# Patient Record
Sex: FEMALE | Race: OTHER | HISPANIC OR LATINO | URBAN - METROPOLITAN AREA
[De-identification: names, ages, dates, MRNs, and addresses within clinical notes are randomized per-mention and may not be internally consistent; named-entity substitution may affect disease eponyms.]

---

## 2024-01-20 ENCOUNTER — EMERGENCY (EMERGENCY)
Facility: HOSPITAL | Age: 54
LOS: 1 days | Discharge: HOME | End: 2024-01-20
Attending: STUDENT IN AN ORGANIZED HEALTH CARE EDUCATION/TRAINING PROGRAM
Payer: COMMERCIAL

## 2024-01-20 VITALS
SYSTOLIC BLOOD PRESSURE: 126 MMHG | HEART RATE: 94 BPM | OXYGEN SATURATION: 98 % | RESPIRATION RATE: 18 BRPM | DIASTOLIC BLOOD PRESSURE: 76 MMHG | TEMPERATURE: 99 F

## 2024-01-20 VITALS
OXYGEN SATURATION: 97 % | RESPIRATION RATE: 18 BRPM | HEART RATE: 99 BPM | DIASTOLIC BLOOD PRESSURE: 83 MMHG | SYSTOLIC BLOOD PRESSURE: 120 MMHG | TEMPERATURE: 97 F | WEIGHT: 136.91 LBS

## 2024-01-20 LAB
ALBUMIN SERPL ELPH-MCNC: 4.1 G/DL — SIGNIFICANT CHANGE UP (ref 3.5–5)
ALP SERPL-CCNC: 153 U/L — HIGH (ref 40–120)
ALT FLD-CCNC: 45 U/L DA — SIGNIFICANT CHANGE UP (ref 10–60)
ANION GAP SERPL CALC-SCNC: 6 MMOL/L — SIGNIFICANT CHANGE UP (ref 5–17)
AST SERPL-CCNC: 48 U/L — HIGH (ref 10–40)
BASOPHILS # BLD AUTO: 0.03 K/UL — SIGNIFICANT CHANGE UP (ref 0–0.2)
BASOPHILS NFR BLD AUTO: 0.2 % — SIGNIFICANT CHANGE UP (ref 0–2)
BILIRUB SERPL-MCNC: 0.6 MG/DL — SIGNIFICANT CHANGE UP (ref 0.2–1.2)
BUN SERPL-MCNC: 13 MG/DL — SIGNIFICANT CHANGE UP (ref 7–18)
CALCIUM SERPL-MCNC: 9.5 MG/DL — SIGNIFICANT CHANGE UP (ref 8.4–10.5)
CHLORIDE SERPL-SCNC: 105 MMOL/L — SIGNIFICANT CHANGE UP (ref 96–108)
CO2 SERPL-SCNC: 27 MMOL/L — SIGNIFICANT CHANGE UP (ref 22–31)
CREAT SERPL-MCNC: 0.84 MG/DL — SIGNIFICANT CHANGE UP (ref 0.5–1.3)
EGFR: 84 ML/MIN/1.73M2 — SIGNIFICANT CHANGE UP
EOSINOPHIL # BLD AUTO: 0.06 K/UL — SIGNIFICANT CHANGE UP (ref 0–0.5)
EOSINOPHIL NFR BLD AUTO: 0.4 % — SIGNIFICANT CHANGE UP (ref 0–6)
GLUCOSE SERPL-MCNC: 130 MG/DL — HIGH (ref 70–99)
HCT VFR BLD CALC: 40.8 % — SIGNIFICANT CHANGE UP (ref 34.5–45)
HGB BLD-MCNC: 13.6 G/DL — SIGNIFICANT CHANGE UP (ref 11.5–15.5)
IMM GRANULOCYTES NFR BLD AUTO: 0.4 % — SIGNIFICANT CHANGE UP (ref 0–0.9)
LYMPHOCYTES # BLD AUTO: 14.2 % — SIGNIFICANT CHANGE UP (ref 13–44)
LYMPHOCYTES # BLD AUTO: 2.01 K/UL — SIGNIFICANT CHANGE UP (ref 1–3.3)
MCHC RBC-ENTMCNC: 29.6 PG — SIGNIFICANT CHANGE UP (ref 27–34)
MCHC RBC-ENTMCNC: 33.3 GM/DL — SIGNIFICANT CHANGE UP (ref 32–36)
MCV RBC AUTO: 88.7 FL — SIGNIFICANT CHANGE UP (ref 80–100)
MONOCYTES # BLD AUTO: 0.87 K/UL — SIGNIFICANT CHANGE UP (ref 0–0.9)
MONOCYTES NFR BLD AUTO: 6.2 % — SIGNIFICANT CHANGE UP (ref 2–14)
NEUTROPHILS # BLD AUTO: 11.08 K/UL — HIGH (ref 1.8–7.4)
NEUTROPHILS NFR BLD AUTO: 78.6 % — HIGH (ref 43–77)
NRBC # BLD: 0 /100 WBCS — SIGNIFICANT CHANGE UP (ref 0–0)
PLATELET # BLD AUTO: 389 K/UL — SIGNIFICANT CHANGE UP (ref 150–400)
POTASSIUM SERPL-MCNC: 3.6 MMOL/L — SIGNIFICANT CHANGE UP (ref 3.5–5.3)
POTASSIUM SERPL-SCNC: 3.6 MMOL/L — SIGNIFICANT CHANGE UP (ref 3.5–5.3)
PROT SERPL-MCNC: 8.2 G/DL — SIGNIFICANT CHANGE UP (ref 6–8.3)
RBC # BLD: 4.6 M/UL — SIGNIFICANT CHANGE UP (ref 3.8–5.2)
RBC # FLD: 12.3 % — SIGNIFICANT CHANGE UP (ref 10.3–14.5)
SODIUM SERPL-SCNC: 138 MMOL/L — SIGNIFICANT CHANGE UP (ref 135–145)
T3 SERPL-MCNC: 161 NG/DL — SIGNIFICANT CHANGE UP (ref 80–200)
T3FREE SERPL-MCNC: 3.45 PG/ML — SIGNIFICANT CHANGE UP (ref 2–4.4)
T4 AB SER-ACNC: 9.3 UG/DL — SIGNIFICANT CHANGE UP (ref 4.6–12)
TSH SERPL-MCNC: 0.41 UU/ML — SIGNIFICANT CHANGE UP (ref 0.34–4.82)
WBC # BLD: 14.11 K/UL — HIGH (ref 3.8–10.5)
WBC # FLD AUTO: 14.11 K/UL — HIGH (ref 3.8–10.5)

## 2024-01-20 PROCEDURE — 36415 COLL VENOUS BLD VENIPUNCTURE: CPT

## 2024-01-20 PROCEDURE — 70491 CT SOFT TISSUE NECK W/DYE: CPT | Mod: MA

## 2024-01-20 PROCEDURE — 80053 COMPREHEN METABOLIC PANEL: CPT

## 2024-01-20 PROCEDURE — 76536 US EXAM OF HEAD AND NECK: CPT

## 2024-01-20 PROCEDURE — 96360 HYDRATION IV INFUSION INIT: CPT | Mod: XU

## 2024-01-20 PROCEDURE — 84436 ASSAY OF TOTAL THYROXINE: CPT

## 2024-01-20 PROCEDURE — 84443 ASSAY THYROID STIM HORMONE: CPT

## 2024-01-20 PROCEDURE — 85025 COMPLETE CBC W/AUTO DIFF WBC: CPT

## 2024-01-20 PROCEDURE — 99285 EMERGENCY DEPT VISIT HI MDM: CPT

## 2024-01-20 PROCEDURE — 99284 EMERGENCY DEPT VISIT MOD MDM: CPT | Mod: 25

## 2024-01-20 PROCEDURE — 76536 US EXAM OF HEAD AND NECK: CPT | Mod: 26

## 2024-01-20 PROCEDURE — 70491 CT SOFT TISSUE NECK W/DYE: CPT | Mod: 26,MA

## 2024-01-20 PROCEDURE — 84481 FREE ASSAY (FT-3): CPT

## 2024-01-20 PROCEDURE — 84480 ASSAY TRIIODOTHYRONINE (T3): CPT

## 2024-01-20 RX ORDER — ACETAMINOPHEN 500 MG
975 TABLET ORAL ONCE
Refills: 0 | Status: COMPLETED | OUTPATIENT
Start: 2024-01-20 | End: 2024-01-20

## 2024-01-20 RX ORDER — SODIUM CHLORIDE 9 MG/ML
1000 INJECTION INTRAMUSCULAR; INTRAVENOUS; SUBCUTANEOUS ONCE
Refills: 0 | Status: COMPLETED | OUTPATIENT
Start: 2024-01-20 | End: 2024-01-20

## 2024-01-20 RX ADMIN — Medication 975 MILLIGRAM(S): at 17:30

## 2024-01-20 RX ADMIN — SODIUM CHLORIDE 1000 MILLILITER(S): 9 INJECTION INTRAMUSCULAR; INTRAVENOUS; SUBCUTANEOUS at 14:44

## 2024-01-20 RX ADMIN — SODIUM CHLORIDE 1000 MILLILITER(S): 9 INJECTION INTRAMUSCULAR; INTRAVENOUS; SUBCUTANEOUS at 13:44

## 2024-01-20 RX ADMIN — Medication 975 MILLIGRAM(S): at 18:30

## 2024-01-20 NOTE — ED PROVIDER NOTE - PHYSICAL EXAMINATION
Gen: Patient is NAD, AAOx3, able to ambulate without assistance  HEENT: NCAT, protecting airway, controlling oral secretions, normal conjunctiva, tongue midline, oral mucosa moist, anterior neck swelling noted with no overlying skin changes   Lung: CTAB, no respiratory distress, no wheezes/rhonchi/rales B/L, speaking in full sentences  CV: RRR, no murmurs, rubs or gallops, distal pulses 2+ b/l  Abd: soft, NT, ND, no guarding, no rigidity, no rebound tenderness, no CVA tenderness   MSK: no visible deformities, ROM normal in UE/LE  Neuro: No focal sensory or motor deficits  Skin: Warm, well perfused, no rash, no leg swelling  Psych: normal affect, calm

## 2024-01-20 NOTE — ED PROVIDER NOTE - ATTENDING CONTRIBUTION TO CARE
Patient presenting with facial swelling airway intact speaking full sentences tolerating p.o. clinically stable will obtain CT rule out infection rule out surgical airway monitor and reassess

## 2024-01-20 NOTE — ED PROVIDER NOTE - NS ED ATTENDING STATEMENT MOD
I have seen and examined this patient and fully participated in the care of this patient as the teaching attending.  The service was shared with the SHAYLEE.  I reviewed and verified the documentation. Attending with

## 2024-01-20 NOTE — ED PROVIDER NOTE - PROGRESS NOTE DETAILS
Segundo PGY3: CT imaging reviewed. CT significant for a 5.6 cm hemorrhagic thyroid cyst with right sided midline shift of larynx and trachea. patient reassessed. reports neck pain and shortness of breath. Patient is speaking full sentences, controlling her secretions and satting 99% on RA. Case discussed with on-call ENT provider via transfer center who recommends patient can be dispo'ed per ED team and that no acute intervention required from ENT standpoint until the biopsy is performed. A shared decision was made with patient to be discharged and urgent follow up with ENT to coordinate for biopsy and strict return precautions.

## 2024-01-20 NOTE — ED PROVIDER NOTE - OBJECTIVE STATEMENT
53-year-old female, no medical history, presenting with 2-day onset of anterior neck swelling, pain.  Patient reports that she has been having cough, sore throat for the past week.  She started noticing neck swelling and pain yesterday night when she was showering.  Reports associated symptoms of painful swallowing.  Reports mild shortness of breath.  Denies fever, chest pain, prior history of thyroid issues, difficulty swallowing solid food or liquid.  History obtained via [place] interpretation service by [place], [place] [id number].

## 2024-01-20 NOTE — ED PROVIDER NOTE - NSFOLLOWUPINSTRUCTIONS_ED_ALL_ED_FT
You came to the emergency room because of neck swelling and pain.      Your symptom is due to a 5.6 cm thyroid cyst.      Please follow-up with the ear-nose-throat (ENT) doctor in the clinic within the next 1 to 3 days for further evaluation and monitoring and to coordinate for the biopsy.     A representative from [facility] will contact you within the next few days to schedule for an appointment.      Please come back to the emergency room if your symptoms get worse -worsening shortness of breath, unable to swallow solid or liquid food, drooling.

## 2024-01-20 NOTE — ED PROVIDER NOTE - PATIENT PORTAL LINK FT
You can access the FollowMyHealth Patient Portal offered by [facility] by registering at the following website: [email or web address]. By joining [employer]’s FollowMyHealth portal, you will also be able to view your health information using other applications (apps) compatible with our system.

## 2024-01-20 NOTE — ED PROVIDER NOTE - CLINICAL SUMMARY MEDICAL DECISION MAKING FREE TEXT BOX
53-year-old female, no medical history, presenting with 2-day onset of anterior neck swelling, pain.  Patient reports that she has been having cough, sore throat for the past week.  She started noticing neck swelling and pain yesterday night when she was showering.  Reports associated symptoms of painful swallowing.  Reports mild shortness of breath.  Denies fever, chest pain, prior history of thyroid issues, difficulty swallowing solid food or liquid.  History obtained via [employer] interpretation service by [place], [place] [id number]. Vital signs within normal limits.  Physical exam patient's not in acute distress, protecting airway, controlling oral secretions, significant anterior neck swelling appreciated with no overlying skin changes, normal respiratory effort, clear lung exam bilaterally, abdomen soft, nontender, no leg swelling noted.  Differentials include but not limited to thyroid goiter versus abscess.  Will get labs, TSH, ultrasound thyroid, CT neck with soft tissue with IV contrast, reassess.

## 2024-01-22 ENCOUNTER — EMERGENCY (EMERGENCY)
Facility: HOSPITAL | Age: 54
LOS: 1 days | Discharge: HOME | End: 2024-01-22
Attending: EMERGENCY MEDICINE | Admitting: EMERGENCY MEDICINE
Payer: COMMERCIAL

## 2024-01-22 VITALS
SYSTOLIC BLOOD PRESSURE: 137 MMHG | DIASTOLIC BLOOD PRESSURE: 72 MMHG | RESPIRATION RATE: 16 BRPM | HEART RATE: 118 BPM | OXYGEN SATURATION: 98 % | TEMPERATURE: 99 F

## 2024-01-22 VITALS
DIASTOLIC BLOOD PRESSURE: 69 MMHG | OXYGEN SATURATION: 100 % | SYSTOLIC BLOOD PRESSURE: 113 MMHG | HEART RATE: 103 BPM | RESPIRATION RATE: 18 BRPM | TEMPERATURE: 100 F

## 2024-01-22 PROBLEM — Z00.00 ENCOUNTER FOR PREVENTIVE HEALTH EXAMINATION: Status: ACTIVE | Noted: 2024-01-22

## 2024-01-22 LAB
ALBUMIN SERPL ELPH-MCNC: 4 G/DL — SIGNIFICANT CHANGE UP (ref 3.3–5)
ALP SERPL-CCNC: 211 U/L — HIGH (ref 40–120)
ALT FLD-CCNC: 59 U/L — HIGH (ref 4–33)
ANION GAP SERPL CALC-SCNC: 16 MMOL/L — HIGH (ref 7–14)
AST SERPL-CCNC: 83 U/L — HIGH (ref 4–32)
B PERT DNA SPEC QL NAA+PROBE: SIGNIFICANT CHANGE UP
B PERT+PARAPERT DNA PNL SPEC NAA+PROBE: SIGNIFICANT CHANGE UP
BASOPHILS # BLD AUTO: 0.03 K/UL — SIGNIFICANT CHANGE UP (ref 0–0.2)
BASOPHILS NFR BLD AUTO: 0.2 % — SIGNIFICANT CHANGE UP (ref 0–2)
BILIRUB SERPL-MCNC: 1 MG/DL — SIGNIFICANT CHANGE UP (ref 0.2–1.2)
BORDETELLA PARAPERTUSSIS (RAPRVP): SIGNIFICANT CHANGE UP
BUN SERPL-MCNC: 12 MG/DL — SIGNIFICANT CHANGE UP (ref 7–23)
C PNEUM DNA SPEC QL NAA+PROBE: SIGNIFICANT CHANGE UP
CALCIUM SERPL-MCNC: 9.4 MG/DL — SIGNIFICANT CHANGE UP (ref 8.4–10.5)
CHLORIDE SERPL-SCNC: 99 MMOL/L — SIGNIFICANT CHANGE UP (ref 98–107)
CO2 SERPL-SCNC: 20 MMOL/L — LOW (ref 22–31)
CREAT SERPL-MCNC: 0.6 MG/DL — SIGNIFICANT CHANGE UP (ref 0.5–1.3)
EGFR: 108 ML/MIN/1.73M2 — SIGNIFICANT CHANGE UP
EOSINOPHIL # BLD AUTO: 0 K/UL — SIGNIFICANT CHANGE UP (ref 0–0.5)
EOSINOPHIL NFR BLD AUTO: 0 % — SIGNIFICANT CHANGE UP (ref 0–6)
FLUAV SUBTYP SPEC NAA+PROBE: SIGNIFICANT CHANGE UP
FLUBV RNA SPEC QL NAA+PROBE: SIGNIFICANT CHANGE UP
GLUCOSE SERPL-MCNC: 116 MG/DL — HIGH (ref 70–99)
HADV DNA SPEC QL NAA+PROBE: SIGNIFICANT CHANGE UP
HCOV 229E RNA SPEC QL NAA+PROBE: SIGNIFICANT CHANGE UP
HCOV HKU1 RNA SPEC QL NAA+PROBE: SIGNIFICANT CHANGE UP
HCOV NL63 RNA SPEC QL NAA+PROBE: SIGNIFICANT CHANGE UP
HCOV OC43 RNA SPEC QL NAA+PROBE: SIGNIFICANT CHANGE UP
HCT VFR BLD CALC: 38.4 % — SIGNIFICANT CHANGE UP (ref 34.5–45)
HGB BLD-MCNC: 13.2 G/DL — SIGNIFICANT CHANGE UP (ref 11.5–15.5)
HMPV RNA SPEC QL NAA+PROBE: SIGNIFICANT CHANGE UP
HPIV1 RNA SPEC QL NAA+PROBE: SIGNIFICANT CHANGE UP
HPIV2 RNA SPEC QL NAA+PROBE: SIGNIFICANT CHANGE UP
HPIV3 RNA SPEC QL NAA+PROBE: SIGNIFICANT CHANGE UP
HPIV4 RNA SPEC QL NAA+PROBE: SIGNIFICANT CHANGE UP
IANC: 13 K/UL — HIGH (ref 1.8–7.4)
IMM GRANULOCYTES NFR BLD AUTO: 0.7 % — SIGNIFICANT CHANGE UP (ref 0–0.9)
LYMPHOCYTES # BLD AUTO: 1.48 K/UL — SIGNIFICANT CHANGE UP (ref 1–3.3)
LYMPHOCYTES # BLD AUTO: 9.3 % — LOW (ref 13–44)
M PNEUMO DNA SPEC QL NAA+PROBE: SIGNIFICANT CHANGE UP
MCHC RBC-ENTMCNC: 29.3 PG — SIGNIFICANT CHANGE UP (ref 27–34)
MCHC RBC-ENTMCNC: 34.4 GM/DL — SIGNIFICANT CHANGE UP (ref 32–36)
MCV RBC AUTO: 85.3 FL — SIGNIFICANT CHANGE UP (ref 80–100)
MONOCYTES # BLD AUTO: 1.22 K/UL — HIGH (ref 0–0.9)
MONOCYTES NFR BLD AUTO: 7.7 % — SIGNIFICANT CHANGE UP (ref 2–14)
NEUTROPHILS # BLD AUTO: 13 K/UL — HIGH (ref 1.8–7.4)
NEUTROPHILS NFR BLD AUTO: 82.1 % — HIGH (ref 43–77)
NRBC # BLD: 0 /100 WBCS — SIGNIFICANT CHANGE UP (ref 0–0)
NRBC # FLD: 0 K/UL — SIGNIFICANT CHANGE UP (ref 0–0)
PLATELET # BLD AUTO: 354 K/UL — SIGNIFICANT CHANGE UP (ref 150–400)
POTASSIUM SERPL-MCNC: 4.4 MMOL/L — SIGNIFICANT CHANGE UP (ref 3.5–5.3)
POTASSIUM SERPL-SCNC: 4.4 MMOL/L — SIGNIFICANT CHANGE UP (ref 3.5–5.3)
PROT SERPL-MCNC: 8.5 G/DL — HIGH (ref 6–8.3)
RAPID RVP RESULT: DETECTED
RBC # BLD: 4.5 M/UL — SIGNIFICANT CHANGE UP (ref 3.8–5.2)
RBC # FLD: 12.8 % — SIGNIFICANT CHANGE UP (ref 10.3–14.5)
RSV RNA SPEC QL NAA+PROBE: SIGNIFICANT CHANGE UP
RV+EV RNA SPEC QL NAA+PROBE: SIGNIFICANT CHANGE UP
SARS-COV-2 RNA SPEC QL NAA+PROBE: DETECTED
SODIUM SERPL-SCNC: 135 MMOL/L — SIGNIFICANT CHANGE UP (ref 135–145)
WBC # BLD: 15.84 K/UL — HIGH (ref 3.8–10.5)
WBC # FLD AUTO: 15.84 K/UL — HIGH (ref 3.8–10.5)

## 2024-01-22 PROCEDURE — 76536 US EXAM OF HEAD AND NECK: CPT | Mod: 26

## 2024-01-22 PROCEDURE — 93010 ELECTROCARDIOGRAM REPORT: CPT

## 2024-01-22 PROCEDURE — 99285 EMERGENCY DEPT VISIT HI MDM: CPT

## 2024-01-22 RX ORDER — SODIUM CHLORIDE 9 MG/ML
1000 INJECTION, SOLUTION INTRAVENOUS ONCE
Refills: 0 | Status: COMPLETED | OUTPATIENT
Start: 2024-01-22 | End: 2024-01-22

## 2024-01-22 RX ORDER — ONDANSETRON 8 MG/1
4 TABLET, FILM COATED ORAL ONCE
Refills: 0 | Status: COMPLETED | OUTPATIENT
Start: 2024-01-22 | End: 2024-01-22

## 2024-01-22 RX ORDER — ACETAMINOPHEN 500 MG
1000 TABLET ORAL ONCE
Refills: 0 | Status: COMPLETED | OUTPATIENT
Start: 2024-01-22 | End: 2024-01-22

## 2024-01-22 RX ORDER — KETOROLAC TROMETHAMINE 30 MG/ML
30 SYRINGE (ML) INJECTION ONCE
Refills: 0 | Status: DISCONTINUED | OUTPATIENT
Start: 2024-01-22 | End: 2024-01-22

## 2024-01-22 RX ORDER — IBUPROFEN 200 MG
1 TABLET ORAL
Qty: 16 | Refills: 0
Start: 2024-01-22 | End: 2024-01-25

## 2024-01-22 RX ADMIN — ONDANSETRON 4 MILLIGRAM(S): 8 TABLET, FILM COATED ORAL at 12:28

## 2024-01-22 RX ADMIN — Medication 30 MILLIGRAM(S): at 12:28

## 2024-01-22 RX ADMIN — SODIUM CHLORIDE 1000 MILLILITER(S): 9 INJECTION, SOLUTION INTRAVENOUS at 13:14

## 2024-01-22 RX ADMIN — Medication 400 MILLIGRAM(S): at 13:15

## 2024-01-22 NOTE — ED PROVIDER NOTE - PATIENT PORTAL LINK FT
You can access the FollowMyHealth Patient Portal offered by [facility] by registering at the following website: [email or web address]. By joining [facility]’s FollowMyHealth portal, you will also be able to view your health information using other applications (apps) compatible with our system.

## 2024-01-22 NOTE — ED PROVIDER NOTE - OBJECTIVE STATEMENT
54 yo F PMH Thyroid Cyst p/w increased pain and reported difficulty swallowing. Reports has not had any solid food since Sunday. Only water. Denies sob, fever, ear or throat pain, drooling. Seen 2 days ago in ED and CT reveals:   "There is a large cystic-appearing focus in the left   lobe of thyroid with slightly hyperintense central content that may be   internal hemorrhage if there has been recent or rapid growth. No   inflammatory type walls thickening or inflammation in the adjacent   central compartment to indicate infection. This measures 5.6 x 5.2 x 5.4   cm in maximum triplanar diameters (transverse x AP x CC). No inferior   extent past the clavicular heads. Smaller cystic nodule seen above it   (coronal image 41). The right lobe is grossly unremarkable, sparing a   subcentimeter nodule or two. "    Discharged with follow up with ENT doctor in 2 days. Came back in 1 day for increased pain and poor po intake since Sunday.   #[id number] Posler

## 2024-01-22 NOTE — ED PROVIDER NOTE - ATTENDING APP SHARED VISIT CONTRIBUTION OF CARE
53-year-old female no pmh pt was seen in the ER 2 days ago she had a CAT scan as well as an ultrasound bshowing hemorrhagic cyst. She was sent home with ENT follow-up.  She has not received a call from them and she is having worsening pain, fevers, increased difficulty swallowing therefore she came back to the emergency department.  She states her fever was 100F yesterday she did not take anything. Denies any sick contacts.    Pt tachycardic here uncomfortable appearing but handling secretions laying in bed without difficulty swallowing no stridor no pharyngeal edema anterior neck ttp. Will obtain labs and see if we can obtain ultrasound for comparison. Will provide analgesia and fluids. Will also obtain RVP to ensure US finding is not incidental.

## 2024-01-22 NOTE — ED PROVIDER NOTE - NSFOLLOWUPINSTRUCTIONS_ED_ALL_ED_FT
Thyroid Nodule  An adult, showing the location of the thyroid gland.  A thyroid nodule is an isolated growth of thyroid cells that forms a lump in the thyroid gland. The thyroid gland is a butterfly-shaped gland found in the lower front of the neck. It sends chemical messengers (hormones) through the blood to all parts of the body. These hormones are important in regulating body temperature and helping the body use energy.    Thyroid nodules are common. Most are not cancerous (are benign). You may have one nodule or several nodules.    There are different types of thyroid nodules. They include nodules that:  Grow and fill with fluid (thyroid cysts).  Produce too much thyroid hormone (hot nodules or hyperthyroid).  Produce no thyroid hormone (cold nodules or hypothyroid).  Form from cancer cells (thyroid cancers).  What are the causes?  In most cases, the cause of thyroid nodules is not known.    What increases the risk?  The following factors may make you more likely to develop thyroid nodules:  Age. Thyroid nodules are more common in people who are older than 45 years.  Female gender.  A family history that includes:  Thyroid nodules.  Pheochromocytoma.  Thyroid carcinoma.  Hyperparathyroidism.  Certain thyroid diseases, such as Hashimoto's thyroiditis.  Lack of iodine in your diet.  A history of head and neck radiation, such as from cancer treatments.  Type 2 diabetes.  What are the signs or symptoms?  In many cases, there are no symptoms. If you have symptoms, they may include:  A lump in your lower neck.  Feeling pressure, fullness, or a tickle in your throat.  Pain in your neck, jaw, or ear.  Having trouble swallowing or breathing.  Hot nodules may cause:  Weight loss.  Warm, flushed skin.  Feeling hot.  Feeling nervous.  A rapid or irregular heartbeat.  Cold nodules may cause:  Weight gain.  Dry skin.  Hair loss, brittle hair, or both.  Feeling cold.  Fatigue.  Thyroid cancer nodules may cause:  Hard nodules that can be felt along the thyroid gland.  Hoarseness.  Lumps in the tissue (lymph nodes) near your thyroid gland.  How is this diagnosed?  A thyroid nodule may be felt by your health care provider during a physical exam. This condition may also be diagnosed based on your symptoms. You may also have tests, including:  Blood tests to check how well your thyroid is working.  An ultrasound. This may be done to confirm the diagnosis.  A biopsy. This involves taking a sample from the nodule and looking at it under a microscope.  A thyroid scan. This test creates an image of the thyroid gland using a radioactive tracer.  Imaging tests such as an MRI or CT scan. These may be done if:  A nodule is large.  A nodule is blocking your airway.  Cancer is suspected.  How is this treated?  Treatment depends on the cause and size of your nodule or nodules. If a nodule is benign, treatment may not be necessary. Your health care provider may monitor the nodule to see if it goes away without treatment. If a nodule continues to grow, is cancerous, or does not go away, treatment may be needed. Treatment may include:  Having a cystic nodule drained with a needle.  Ablation therapy. In this treatment, alcohol is injected into the area of the nodule to destroy the cells. Ablation with heat may also be used. This is called thermal ablation.  Radioactive iodine. In this treatment, radioactive iodine is given as a pill or liquid that you drink. This substance causes the thyroid nodule to shrink.  Surgery to remove the nodule or nodules. Part or all of your thyroid gland may also need to be removed.  Medicines to treat hyperthyroidism.  Follow these instructions at home:  Pay attention to any changes in your thyroid nodule or nodules.  Take over-the-counter and prescription medicines only as told by your health care provider.  Keep all follow-up visits. This is important.  Contact a health care provider if:  You have trouble sleeping.  You have muscle weakness.  You have significant weight loss without changing your eating habits.  You feel nervous.  You have trouble swallowing.  You have increased swelling.  You have a rapid or irregular heartbeat.  Get help right away if:  You have chest pain.  You faint or lose consciousness.  Your nodule makes it hard for you to breathe.  These symptoms may be an emergency. Get help right away. Call 911.  Do not wait to see if the symptoms will go away.  Do not drive yourself to the hospital.  Summary  A thyroid nodule is an isolated growth of thyroid cells that forms a lump in your thyroid gland.  Thyroid nodules are common. Most are not cancerous.  Your health care provider may monitor the nodule to see if it goes away without treatment. If a nodule continues to grow, is cancerous, or does not go away, treatment may be needed.  Treatment depends on the cause and size of your nodule or nodules.  This information is not intended to replace advice given to you by your health care provider. Make sure you discuss any questions you have with your health care provider. COVID-19  COVID-19 is an infection caused by a virus called SARS-CoV-2. Most people who get COVID-19 have mild to moderate symptoms. Some have little to no symptoms. In others, the virus may cause a severe infection.    What are the causes?  The human body, showing how the coronavirus travels from the air to a person's lungs.  COVID-19 is caused by a coronavirus. The virus may be in the air as droplets or as tiny specks of fluid (aerosols). It may also be on surfaces. You may catch the virus if you:  Breathe in droplets when a person with COVID-19 breathes, speaks, sings, coughs, or sneezes.  Touch something that has the virus on it and then touch your mouth, nose, or eyes.  What increases the risk?  Risk for infection:    You are more likely to get COVID-19 if:  You are within 6 ft (1.8 m) of a person who has COVID-19 for 15 minutes or longer.  You provide care to a person who has COVID-19.  You are in close contact with others. This includes hugging, kissing, or sharing utensils.  Risk for serious illness caused by COVID-19:    You are more likely to get very ill from COVID-19 if:  You have cancer.  You have a long-term (chronic) disease. This may be:  A chronic lung disease, such as pulmonary embolism, chronic obstructive pulmonary disease (COPD), or cystic fibrosis.  A disease that affects your body's defense system (immune system). If you have a weak immune system, you are said to be immunocompromised.  A serious heart condition, such as heart failure, coronary artery disease, or cardiomyopathy.  Diabetes.  Chronic kidney disease.  A liver disease, such as cirrhosis, nonalcoholic fatty liver disease, alcoholic liver disease, or autoimmune hepatitis.  You are obese.  You are pregnant or were just pregnant.  You have sickle cell disease.  What are the signs or symptoms?  Symptoms of COVID-19 can range from mild to severe. They may appear any time from 2 to 14 days after you are exposed. They include:  Fever or chills.  Shortness of breath or trouble breathing.  Feeling tired.  Headaches, body aches, or muscle aches.  A runny or stuffy nose.  Sneezing, coughing, or a sore throat.  New loss of taste or smell.  You may also have stomach problems, such as nausea, vomiting, or diarrhea.    In some cases, you may not have any symptoms.    How is this diagnosed?  A sample being collected by swabbing the nose.  COVID-19 may be diagnosed by testing a sample to check for the virus. The most common tests are the PCR test and the antigen test. Tests may be done in the lab or at home. They include:  Using a swab to take a sample of fluid from your nose.  Testing a sample of saliva from your mouth.  Testing a sample of mucus from your lungs (sputum).  How is this treated?  Treatment for COVID-19 depends on how severe your condition is.  Mild symptoms can be treated at home. You should rest, drink fluids, and take over-the-counter medicine.  If you have symptoms and risk factors, you may be prescribed a medicine that fights viruses (antiviral).  Severe symptoms may be treated in a hospital intensive care unit (ICU). Treatment may include:  Extra oxygen given through a tube in the nose, a face mask, or a camara.  Medicines. These may include:  Antivirals, such as remdesivir.  Anti-inflammatories, such as corticosteroids. These help reduce inflammation.  Antithrombotics. These help prevent or treat blood clots.  Convalescent plasma. This helps boost your immune system.  Prone positioning. This is when you are laid on your stomach to help oxygen get into your lungs.  Infection control measures.  If you are at risk for a more serious illness, your health care provider may prescribe two medicines to help your immune system protect you. These are called long-acting monoclonal antibodies. They are given together every 6 months.    How is this prevented?  To protect yourself:    Get the vaccine or vaccine series if you meet the guidelines. You can even get the vaccine while you are pregnant or making breast milk (lactating).  Get an added dose of the vaccine if you are immunocompromised. This applies if you have had an organ transplant or if you have a condition that affects your immune system.  You should get the added dose 4 weeks after you got the first one.  If you get an mRNA vaccine, you will need to get 3 doses.  Talk to your provider about getting experimental monoclonal antibodies. This treatment can help prevent severe illness. It may be given to you if:  You are immunocompromised.  You cannot get the vaccine. You may not get the vaccine if you have a severe allergic reaction to it or to what it is made of.  You are not fully vaccinated.  You are in a place where there is COVID-19 and:  You are in close contact with someone who has COVID-19.  You are at high risk of being exposed.  You are at risk of illness from new variants of the virus.  To protect others:    If you have symptoms of COVID-19, take steps to stop the virus from spreading.  Stay home. Leave your house only to get medical care. Do not use public transit.  Do not travel while you are sick.  Wash your hands often with soap and water for at least 20 seconds. If soap and water are not available, use alcohol-based hand .  Make sure that all people in your household wash their hands well and often.  Cough or sneeze into a tissue or your sleeve or elbow. Do not cough or sneeze into your hand or into the air.  Where to find more information  Centers for Disease Control and Prevention (CDC): cdc.gov  World Health Organization (WHO): who.int  Get help right away if:  You have trouble breathing.  You have pain or pressure in your chest.  You are confused.  Your lips or fingernails turn blue.  You have trouble waking from sleep.  Your symptoms get worse.  These symptoms may be an emergency. Get help right away. Call 911.  Do not wait to see if the symptoms will go away.  Do not drive yourself to the hospital.  This information is not intended to replace advice given to you by your health care provider. Make sure you discuss any questions you have with your health care provider.

## 2024-01-22 NOTE — ED PROVIDER NOTE - PROGRESS NOTE DETAILS
KAYDEN Lundy: Spoke to ENT center and patient placed on schedule. As per team just waiting to get in touch with ENT MD  to set appointment time. Pending pain control. Received toradol IM.

## 2024-01-22 NOTE — ED ADULT NURSE NOTE - NSFALLUNIVINTERV_ED_ALL_ED
Bed/Stretcher in lowest position, wheels locked, appropriate side rails in place/Call bell, personal items and telephone in reach/Instruct patient to call for assistance before getting out of bed/chair/stretcher/Non-slip footwear applied when patient is off stretcher/[place] to call system/Physically safe environment - no spills, clutter or unnecessary equipment/Purposeful proactive rounding/Room/bathroom lighting operational, light cord in reach

## 2024-01-22 NOTE — ED ADULT NURSE NOTE - OBJECTIVE STATEMENT
Pt with co neck pain diagnosed with cyst to thyroid area but did not follow up , returns with pain to neck area. pt medicated for her symptoms , NP at bedside using  phone for translation. awaiting dispo.

## 2024-01-22 NOTE — ED ADULT TRIAGE NOTE - CHIEF COMPLAINT QUOTE
Patient c/o neck pain and swelling x 3 days. Endorsing fever. Patient seen in ED 2 days ago, diagnosed with thyroid cyst, told to follow up with ENT clinic for biopsy but patient does not have appt. Swelling noted, no redness. Able to tolerate PO, no signs of respiratory distress, able to speak in full sentences. Denies phx

## 2024-01-22 NOTE — ED PROVIDER NOTE - CLINICAL SUMMARY MEDICAL DECISION MAKING FREE TEXT BOX
52 yo F PMH Thyroid Cyst p/w increased pain and reported difficulty swallowing. Reports has not had any solid food since Sunday. Only water. Denies sob, fever, ear or throat pain, drooling. Seen 2 days ago in ED and CT reveals left thyroid cyst. Discharged with follow up with ENT doctor in 2 days. Came back in 1 day for increased pain and poor po intake since Sunday.  Pain control   Follow up on ENT referral   #[id number] Posler  Dispo Home with pain control and follow up plan and/or schedule

## 2024-01-22 NOTE — ED PROVIDER NOTE - IV ALTEPLASE EXCL ABS HIDDEN
Consult Note            Date:1/11/2022        Patient Name:Efe Poole     YOB: 1988     Age:33 y.o. Reason for Consult:    Medical h and p comorbid condition  Chief Complaint     Chief Complaint   Patient presents with    Abdominal Pain      No current abdominal pain. Want to go home. Is regretful of his comment about suicide and said he was drunk    History Obtained From   patient    History of Present Illness   80-year-old male past medical history of alcohol and chronic depression denies being on medications for hypertension presented to the emergency department Abdominal pain to his right lower quadrant. Patient drinks heavily every day 1-2 bottles of Crown. He has been drinking for many years. He was recently through a detox program and began to drink again. He denies any fevers or chills. He denies any current abdominal pain. He denies shortness of breath cough or congestion. He is pleasant and cooperative. He has managed through the [place]. He has been feeling depressed, he has had suicidal ideation but does not have any intent and is regretful of his comment he made in the emergency department. His work-up in the emergency department showed a normal white blood cell count. His electrolytes are normal.  His kidney function is normal.  His-personally reviewed. Urine toxicology screen is negative. Alcohol level was 298 with a 0.261%. Patient states that he stopped drinking 1-2 times a week he has never gone through alcohol withdrawal even when he stopped drinking for months at a time. He denies feeling edgy, no tremors, no headache no sensitivity to light or sound. Past Medical History     Past Medical History:   Diagnosis Date    Alcohol abuse     Depression 1/10/2022        Past Surgical History   History reviewed. No pertinent surgical history. Medications     Prior to Admission medications    Medication Sig Start Date End Date Taking?  Authorizing Provider ibuprofen (ADVIL;MOTRIN) 800 MG tablet Take 1 tablet by mouth every 8 hours as needed for Pain or Fever 3/22/21   Wally Acevedo PA-C        [START ON 1/12/2022] venlafaxine (EFFEXOR XR) extended release capsule 75 mg, Daily with breakfast  haloperidol lactate (HALDOL) injection 5 mg, Q6H PRN   Or  haloperidol (HALDOL) tablet 5 mg, Q6H PRN  hydrOXYzine (VISTARIL) capsule 50 mg, Q6H PRN   Or  hydrOXYzine (VISTARIL) injection 50 mg, Q6H PRN  acetaminophen (TYLENOL) tablet 650 mg, Q4H PRN  traZODone (DESYREL) tablet 50 mg, Nightly PRN  benztropine mesylate (COGENTIN) injection 2 mg, BID PRN  magnesium hydroxide (MILK OF MAGNESIA) 400 MG/5ML suspension 30 mL, Daily PRN  chlordiazePOXIDE (LIBRIUM) capsule 10 mg, Q4H PRN   Or  chlordiazePOXIDE (LIBRIUM) capsule 25 mg, Q4H PRN   Or  chlordiazePOXIDE (LIBRIUM) capsule 50 mg, Q2H PRN   Or  chlordiazePOXIDE (LIBRIUM) capsule 75 mg, Q1H PRN   Or  LORazepam (ATIVAN) injection 4 mg, M3K PRN  folic acid (FOLVITE) tablet 1 mg, Daily  multivitamin 1 tablet, Daily  ondansetron (ZOFRAN-ODT) disintegrating tablet 4 mg, Q6H PRN  thiamine tablet 100 mg, Daily  nicotine polacrilex (NICORETTE) gum 4 mg, Q2H PRN  NIFEdipine (PROCARDIA XL) extended release tablet 30 mg, Daily  influenza quadrivalent split vaccine (FLUZONE;FLUARIX;FLULAVAL;AFLURIA) injection 0.5 mL, Prior to discharge        Allergies   Patient has no known allergies. Social History     Social History     Tobacco History     Smoking Status  Heavy Tobacco Smoker    Smokeless Tobacco Use  Never Used          Alcohol History     Alcohol Use Status  Yes          Drug Use     Drug Use Status  Yes          Sexual Activity     Sexually Active  Yes                Family History   History reviewed. No pertinent family history. Review of Systems   History obtained from the patient.   12 point review of systems reviewed with patient pertinent positives as in HPI otherwise review systems negative    Physical Exam   BP (!) 149/104 Comment: RN Notified  Pulse 96   Temp 98.8 °F (37.1 °C) (Oral)   Resp 18   Ht 5' 6\" (1.676 m)   Wt 200 lb (90.7 kg)   SpO2 99%   BMI 32.28 kg/m²     CONSTITUTIONAL:  awake, alert, cooperative, no apparent distress, and appears stated age  EYES:  Lids and lashes normal, pupils equal, round and reactive to light, extra ocular muscles intact, sclera clear, conjunctiva normal  ENT:  Normocephalic, without obvious abnormality, atraumatic, sinuses nontender on palpation, external ears without lesions, oral pharynx with moist mucus membranes, tonsils without erythema or exudates, gums normal and good dentition. NECK:  Supple, symmetrical, trachea midline, no adenopathy, thyroid symmetric, not enlarged and no tenderness, skin normal  BACK:  Symmetric, no curvature, spinous processes are non-tender on palpation, paraspinous muscles are non-tender on palpation, no costal vertebral tenderness  LUNGS:  No increased work of breathing, good air exchange, clear to auscultation bilaterally, no crackles or wheezing  CARDIOVASCULAR:  Normal apical impulse, regular rate and rhythm, normal S1 and S2, no S3 or S4, and no murmur noted  ABDOMEN:  No scars, normal bowel sounds, soft, non-distended, non-tender, no masses palpated, no hepatosplenomegally  MUSCULOSKELETAL:  There is no redness, warmth, or swelling of the joints. Full range of motion noted. Motor strength is 5 out of 5 all extremities bilaterally. Tone is normal.  NEUROLOGIC:  Awake, alert, oriented to name, place and time. Cranial nerves II-XII are grossly intact. Motor is 5 out of 5 bilaterally. Cerebellar finger to nose, heel to shin intact. Sensory is intact.   Babinski down going, Romberg negative, and gait is normal.  SKIN:  skin warm dry and intact    Labs    CBC:  Recent Labs     01/09/22  1700   WBC 5.5   RBC 4.98   HGB 15.1   HCT 44.5   MCV 89.3   RDW 14.5        CHEMISTRIES:  Recent Labs     01/09/22  1700 01/09/22  1754     --    K 4.7 --      --    CO2 25  --    BUN 9  --    CREATININE 1.02 1.4*   GLUCOSE 110*  --    MG 2.2  --      PT/INR:No results for input(s): PROTIME, INR in the last 72 hours. APTT:No results for input(s): APTT in the last 72 hours. LIVER PROFILE:  Recent Labs     01/09/22  1700   AST 34   ALT 29   BILITOT <0.2   ALKPHOS 91       Imaging/Diagnostics   No results found.     Assessment      Hospital Problems           Last Modified POA    Depression 1/10/2022 Yes      Acute on chronic depression  chronic alcohol dependence  nicotine dependence  abdominal pain-resolved  hypertension  Plan   patient seen and examined  afebrile  hemodynamically stable  no evidence of acute alcohol withdrawal  psychiatry admitting to inpatient  he has no medical history  his blood pressure was noted to be elevated on arrival-he was started on nifedipine 30 mg daily-blood pressure is better controlled today  outpatient echocardiogram  patient can be followed as outpatient for his blood pressure please call if blood pressure uncontrolled  he will need nifedipine 30 mg tablet oral daily at discharge this may be increased to 60 if continues to be uncontrolled  Electronically signed by SILVANA Alegria on 1/11/22 at 10:44 AM EST show

## 2024-01-23 PROBLEM — E04.1 NONTOXIC SINGLE THYROID NODULE: Chronic | Status: ACTIVE | Noted: 2024-01-22

## 2024-01-28 ENCOUNTER — NON-APPOINTMENT (OUTPATIENT)
Age: 54
End: 2024-01-28

## 2024-01-29 ENCOUNTER — APPOINTMENT (OUTPATIENT)
Dept: OTOLARYNGOLOGY | Facility: CLINIC | Age: 54
End: 2024-01-29
Payer: COMMERCIAL

## 2024-01-29 VITALS — BODY MASS INDEX: 24.84 KG/M2 | HEIGHT: 62 IN | WEIGHT: 135 LBS

## 2024-01-29 DIAGNOSIS — E04.1 NONTOXIC SINGLE THYROID NODULE: ICD-10-CM

## 2024-01-29 PROCEDURE — 31575 DIAGNOSTIC LARYNGOSCOPY: CPT

## 2024-01-29 PROCEDURE — 99204 OFFICE O/P NEW MOD 45 MIN: CPT | Mod: 25

## 2024-01-29 NOTE — ASSESSMENT
[FreeTextEntry1] : 53 year old female for initial evaluation of thyroid nodules,: Recent CT showed large left thyroid cystic mass up to 5.6 cm in diameter and with possible internal hemorrhage given recent onset of pain. Larynx and trachea shifted midline    -Fiberoptic exam showed patient airway--no supraglottic airway compromise -Reviewed imaging in depth with pt and her daughter -Will refer for IR US guided FNA and decompression aspiration of the left thyroid mass.  -Will assist with setting up appointment for this week --Instructed to present to ER if any acute worsening -Follow up in 1 week, to re-evaluate  -They are in agreement with this plan

## 2024-01-30 ENCOUNTER — RESULT REVIEW (OUTPATIENT)
Age: 54
End: 2024-01-30

## 2024-02-02 ENCOUNTER — APPOINTMENT (OUTPATIENT)
Dept: ULTRASOUND IMAGING | Facility: IMAGING CENTER | Age: 54
End: 2024-02-02
Payer: COMMERCIAL

## 2024-02-02 ENCOUNTER — OUTPATIENT (OUTPATIENT)
Dept: OUTPATIENT SERVICES | Facility: HOSPITAL | Age: 54
LOS: 1 days | End: 2024-02-02
Payer: COMMERCIAL

## 2024-02-02 ENCOUNTER — RESULT REVIEW (OUTPATIENT)
Age: 54
End: 2024-02-02

## 2024-02-02 DIAGNOSIS — E04.1 NONTOXIC SINGLE THYROID NODULE: ICD-10-CM

## 2024-02-02 PROCEDURE — 10005 FNA BX W/US GDN 1ST LES: CPT

## 2024-02-02 PROCEDURE — 88172 CYTP DX EVAL FNA 1ST EA SITE: CPT

## 2024-02-02 PROCEDURE — 88173 CYTOPATH EVAL FNA REPORT: CPT | Mod: 26

## 2024-02-02 PROCEDURE — 88173 CYTOPATH EVAL FNA REPORT: CPT

## 2024-02-05 ENCOUNTER — APPOINTMENT (OUTPATIENT)
Dept: OTOLARYNGOLOGY | Facility: CLINIC | Age: 54
End: 2024-02-05
Payer: COMMERCIAL

## 2024-02-05 VITALS — BODY MASS INDEX: 24.84 KG/M2 | WEIGHT: 135 LBS | HEIGHT: 62 IN

## 2024-02-05 DIAGNOSIS — E04.9 NONTOXIC GOITER, UNSPECIFIED: ICD-10-CM

## 2024-02-05 LAB — NON-GYNECOLOGICAL CYTOLOGY STUDY: SIGNIFICANT CHANGE UP

## 2024-02-05 PROCEDURE — 31575 DIAGNOSTIC LARYNGOSCOPY: CPT

## 2024-02-05 PROCEDURE — 99214 OFFICE O/P EST MOD 30 MIN: CPT | Mod: 25

## 2024-02-05 NOTE — HISTORY OF PRESENT ILLNESS
[de-identified] : 53 year old female for follow up of thyroid nodules. She is s/p FNA on 2/2/24, pathology pending  States she has phlegm that she cannot bring up She has shortness of breath, especially worse when working (shes a .) None noted with rest Continuing to have dysphagia with both solids & liquids  Noticed 1/19 - went to bed friday night, woke up the next morning with a left sided neck  mass that prompted her to go to [facility]. Having mild pain to the area, especially worse when she lays flat. Having difficulty swallowing both solids and liquids. Painful swallowing and choking. Lost about 15lbs within 2 weeks due to inability to eat. She is experiencing SOB with both rest and activity. No changes to voice, but it is painful to speak.  No Smoking history No ETOH use  CT neck 1/20/24 IMPRESSION: Large left thyroid cystic mass up to 5.6 cm in diameter and with possible internal hemorrhage given recent onset of pain. Larynx and trachea shifted midline   US thyroid and parathyroid 1/20/24 IMPRESSION: 5.5 cm complex cyst of the left thyroid lobe likely to represent recent hemorrhage within pre existing thyroid nodule   Labs 1/20/24 TSH  0.41, t4 9.3

## 2024-02-05 NOTE — PHYSICAL EXAM
[Midline] : trachea located in midline position [Normal] : no rashes [de-identified] : right sided neck mass

## 2024-02-05 NOTE — ASSESSMENT
[FreeTextEntry1] : 53 year old female for initial evaluation of thyroid nodules,: Recent CT showed large left thyroid cystic mass up to 5.6 cm in diameter and with possible internal hemorrhage given recent onset of pain. Larynx and trachea shifted midline    -Fiberoptic exam showed patient airway--no supraglottic airway compromise -Reviewed imaging in depth with pt and her daughter -Pathology pending, will call patient with results  -Discussed treatment options, including  thyroid lobectomy and observation with surveillance ultrasounds -Pt expressed wishes to proceed with surgery at this time --will book for left thyroid lobectomy at this time. She understands need for possible completion thyroidectomy if indicated on final pathology -Follow up in 3 weeks to discuss pathology and surgery further and next steps    --Instructed to present to ER if any acute worsening -They are in agreement with this plan

## 2024-02-16 ENCOUNTER — APPOINTMENT (OUTPATIENT)
Dept: CT IMAGING | Facility: CLINIC | Age: 54
End: 2024-02-16
Payer: COMMERCIAL

## 2024-02-16 PROCEDURE — 70491 CT SOFT TISSUE NECK W/DYE: CPT

## 2024-02-21 LAB — THYROSEQ RESULT: SIGNIFICANT CHANGE UP

## 2024-02-26 ENCOUNTER — APPOINTMENT (OUTPATIENT)
Dept: OTOLARYNGOLOGY | Facility: CLINIC | Age: 54
End: 2024-02-26
Payer: COMMERCIAL

## 2024-02-26 VITALS
SYSTOLIC BLOOD PRESSURE: 112 MMHG | BODY MASS INDEX: 24.84 KG/M2 | DIASTOLIC BLOOD PRESSURE: 78 MMHG | WEIGHT: 135 LBS | HEART RATE: 74 BPM | HEIGHT: 62 IN

## 2024-02-26 PROCEDURE — 31575 DIAGNOSTIC LARYNGOSCOPY: CPT

## 2024-02-26 PROCEDURE — 99214 OFFICE O/P EST MOD 30 MIN: CPT | Mod: 25

## 2024-02-26 NOTE — ASSESSMENT
[FreeTextEntry1] : 53 year old female for initial evaluation of thyroid nodules,: Recent CT showed large left thyroid cystic mass up to 5.6 cm in diameter and with possible internal hemorrhage given recent onset of pain. Larynx and trachea shifted midline    --Fiberoptic exam showed stable patient airway--no supraglottic airway compromise --Reviewed Pathology and imaging in depth with pt and her daughter --Discussed treatment options, including thyroid lobectomy and observation with surveillance ultrasounds --Pt expressed wishes to proceed with surgery at this time --will book for left thyroid lobectomy at this time. She understands need for possible completion thyroidectomy if indicated on final pathology.  --Surgery scheduled for 3/5/24  --Instructed to present to ER if any acute worsening --They are in agreement with this plan

## 2024-02-26 NOTE — REASON FOR VISIT
[Other: _____] : [unfilled] [FreeTextEntry1] : Thyroid nodule [FreeTextEntry3] : Patient declined offer of translation service. Patient preferred to use accompanying family member/friend for translation.

## 2024-02-26 NOTE — HISTORY OF PRESENT ILLNESS
[de-identified] : 53 year old female for follow up of thyroid nodules. She is s/p FNA on 2/2/24 which was suspicious   for neoplasm (Category IV). Thyroseq was negative with low variable   Today, patient continues to report difficulty bringing up phlegm.  States shortness of breath has improved. No shortness of breath at rest.  States dysphagia has improved, she is currently tolerating regular diet and liquids.  No new neck swelling noted.   Noticed 1/19 - went to bed Friday night, woke up the next morning with a left sided neck  mass that prompted her to go to [facility] Having mild pain to the area, especially worse when she lays flat. Having difficulty swallowing both solids and liquids. Painful swallowing and choking. Lost about 15lbs within 2 weeks due to inability to eat. She is experiencing SOB with both rest and activity. No changes to voice, but it is painful to speak.  No Smoking history No ETOH use  2/2/24 FNA: Fine Needle Aspiration Report - Auth (Verified) Specimen(s) Submitted THYROID, LEFT, MID/LOWER POLE, US GUIDED FNA Final Diagnosis:  THYROID, LEFT, MID/LOWER POLE, US GUIDED FNA SUSPICIOUS FOR NEOPLASM   (Category IV). Hypo cellular specimen show follicular cells with abundant oncocytic cytoplasm mostly lying single with rare microfollicles and syncytial groups. These cells have enlarged nuclei, high nuclear/cytoplasmic ratio and finely granular cytoplasm. The background shows calcifications, macrophages and colloid but lacks lymphocytes. Although these features can be seen in hyperplastic nodule,   the overall features are suspicious for a oncocytic cell neoplasm  . Material has been sent for molecular test for thyroid cancer gene panel analysis at the request of the submitting clinician. An addendum will follow.  Test            Result         Reference      Accession      Result Date Range          Number ThyroSeq        See Note f                    [id number] 02/02/24 0              15:40 EST ThyroSeq Comment: CLINICAL HISTORY FNA cytology:  FN/SFN  ([place] IV)  THYROSEQ  V3 GC RESULTS SUMMARY LEFT MID THYROID, 5.8 CM NODULE, FNA  Test Result                             Probability of Cancer or NIFTP                 Potential Management NEGATIVE, WITH LOW               Likely low Variable * THYROID CELL CONTENT *See interpretation below for details   CT Neck 2/16/24:  COMPARISON: CT neck from 1/20/2024. Ultrasound neck from 2/20/2024 and back to 1/20/2024 FINDINGS: BRAIN: No focal enhancing lesions in the partially imaged brain parenchyma. SKULL BASE: Unremarkable AERODIGESTIVE TRACT: No masslike lesions or abnormal enhancement at the base of the tongue, nasopharynx and oropharynx. Lingual tonsillar hypertrophy and mild effacement of vallecular space. Vocal cords are symmetric. Parapharyngeal fat is intact. Supraglottic, glottic and infraglottic airways are patent. LYMPH NODES: No cervical adenopathy. SALIVARY GLANDS: Salivary glands are normal in size and symmetric. No abnormal enhancement or calcifications are identified. THYROID: Redemonstration of cystic mass lesions within left thyroid lobe measuring 4.9 cm x 4.5 a cm x 5.5 cm (TR X AP X CC), previously measured 5.6 cm x 5.2 cm x 5.4 cm. There is no enhancing nodular component. No surrounding inflammatory fat stranding or invasive features. Previously noted central hyperdensity component is not well seen on current exam. Associated mass effect with rightward tracheal deviation, similar to prior examination. Laterally mild compression of internal jugular vein which remains patent throughout.  Smaller hypodense nodule at superior aspect of the left lobe. Small hypodense nodule in the right thyroid lobe. PARANASAL SINUSES: The visualized paranasal sinuses are clear. Mastoids are intact. ORBITS: Intraorbital content is unremarkable. VESSELS: Patent extracranial carotid and vertebral arteries accounting for limits of given exam which is not tailored for angiographic assessment. LUNG APICES: No masses. BONES: No acute osseous abnormalities. IMPRESSION: Redemonstration of cystic mass lesion in the left thyroid lobe slightly decreased in size compared to prior. Resolution of previously noted central hyperdense component. Persistent mass effect with rightward tracheal deviation. No mass lesions or abnormal enhancement in aerodigestive mucosa. No cervical adenopathy. --- End of Report ---   CT neck 1/20/24 IMPRESSION: Large left thyroid cystic mass up to 5.6 cm in diameter and with possible internal hemorrhage given recent onset of pain. Larynx and trachea shifted midline   US thyroid and parathyroid 1/20/24 IMPRESSION: 5.5 cm complex cyst of the left thyroid lobe likely to represent recent hemorrhage within pre existing thyroid nodule   Labs 1/20/24 TSH  0.41, t4 9.3

## 2024-02-28 ENCOUNTER — OUTPATIENT (OUTPATIENT)
Dept: OUTPATIENT SERVICES | Facility: HOSPITAL | Age: 54
LOS: 1 days | End: 2024-02-28

## 2024-02-28 VITALS
DIASTOLIC BLOOD PRESSURE: 79 MMHG | HEIGHT: 61.5 IN | HEART RATE: 70 BPM | RESPIRATION RATE: 16 BRPM | SYSTOLIC BLOOD PRESSURE: 131 MMHG | TEMPERATURE: 98 F | OXYGEN SATURATION: 97 % | WEIGHT: 134.92 LBS

## 2024-02-28 DIAGNOSIS — E04.1 NONTOXIC SINGLE THYROID NODULE: ICD-10-CM

## 2024-02-28 DIAGNOSIS — Z90.49 ACQUIRED ABSENCE OF OTHER SPECIFIED PARTS OF DIGESTIVE TRACT: Chronic | ICD-10-CM

## 2024-02-28 DIAGNOSIS — Z98.890 OTHER SPECIFIED POSTPROCEDURAL STATES: Chronic | ICD-10-CM

## 2024-02-28 LAB
HCG UR QL: NEGATIVE — SIGNIFICANT CHANGE UP
HCT VFR BLD CALC: 36.8 % — SIGNIFICANT CHANGE UP (ref 34.5–45)
HGB BLD-MCNC: 12.6 G/DL — SIGNIFICANT CHANGE UP (ref 11.5–15.5)
MCHC RBC-ENTMCNC: 29.5 PG — SIGNIFICANT CHANGE UP (ref 27–34)
MCHC RBC-ENTMCNC: 34.2 GM/DL — SIGNIFICANT CHANGE UP (ref 32–36)
MCV RBC AUTO: 86.2 FL — SIGNIFICANT CHANGE UP (ref 80–100)
NRBC # BLD: 0 /100 WBCS — SIGNIFICANT CHANGE UP (ref 0–0)
NRBC # FLD: 0 K/UL — SIGNIFICANT CHANGE UP (ref 0–0)
PLATELET # BLD AUTO: 301 K/UL — SIGNIFICANT CHANGE UP (ref 150–400)
RBC # BLD: 4.27 M/UL — SIGNIFICANT CHANGE UP (ref 3.8–5.2)
RBC # FLD: 13.2 % — SIGNIFICANT CHANGE UP (ref 10.3–14.5)
WBC # BLD: 6.21 K/UL — SIGNIFICANT CHANGE UP (ref 3.8–10.5)
WBC # FLD AUTO: 6.21 K/UL — SIGNIFICANT CHANGE UP (ref 3.8–10.5)

## 2024-02-28 RX ORDER — SODIUM CHLORIDE 9 MG/ML
1000 INJECTION, SOLUTION INTRAVENOUS
Refills: 0 | Status: DISCONTINUED | OUTPATIENT
Start: 2024-03-05 | End: 2024-03-05

## 2024-02-28 NOTE — H&P PST ADULT - ENDOCRINE COMMENTS
hx of left neck mass which she first noted 1 month ago.  Thyroid nodule dx with thyroid nodule on subsequent imaging, benign on biopsy per pt.  Scheduled for Left thyroid lobectomy

## 2024-02-28 NOTE — H&P PST ADULT - NSANTHOSAYNRD_GEN_A_CORE
No. LULY screening performed.  STOP BANG Legend: 0-2 = LOW Risk; 3-4 = INTERMEDIATE Risk; 5-8 = HIGH Risk

## 2024-02-28 NOTE — H&P PST ADULT - PROBLEM SELECTOR PLAN 1
Left thyroid lobectomy 3/5/24    CBC UCG     Preop instructions and antibacterial soap given and explained (verbal and written), with teach back.

## 2024-02-28 NOTE — H&P PST ADULT - HISTORY OF PRESENT ILLNESS
52 y/o female with hx of left neck mass which she first noted 1 month ago. She also noted increase in mucous in  her throat at that times and intermittently  she felt like she needed to cough to bring up the mucous.  Thyroid nodule dx with thyroid nodule on subsequent imaging, benign on biopsy per pt.  Scheduled for Left thyroid lobectomy

## 2024-02-28 NOTE — H&P PST ADULT - ASSESSMENT
52 y/o female with hx of left neck mass which she first noted 1 month ago.  Thyroid nodule dx with thyroid nodule on subsequent imaging, benign on biopsy per pt.  Scheduled for Left thyroid lobectomy 52 y/o female with hx of left neck mass which she first noted 1 month ago. She also noted increase in mucous in  her throat at that times and intermittently  she felt like she needed to cough to bring up the mucous.  Thyroid nodule dx with thyroid nodule on subsequent imaging, benign on biopsy per pt.  Scheduled for Left thyroid lobectomy

## 2024-02-28 NOTE — H&P PST ADULT - RESPIRATORY
clear to auscultation bilaterally/no rales/no rhonchi/no respiratory distress/respirations non-labored

## 2024-02-28 NOTE — H&P PST ADULT - NECK
thyromegaly/palpable masses/thyroid nodule non tender on extension/thyromegaly/palpable masses/thyroid nodule

## 2024-02-28 NOTE — H&P PST ADULT - ENMT COMMENTS
Pt reports mucous in throat x 1 month since thyroid mass discovered; with feeling like she has to cough Mallampati 2

## 2024-02-28 NOTE — H&P PST ADULT - NSICDXPASTSURGICALHX_GEN_ALL_CORE_FT
PAST SURGICAL HISTORY:  History of cholecystectomy      PAST SURGICAL HISTORY:  History of cholecystectomy     History of colonoscopy

## 2024-03-04 ENCOUNTER — APPOINTMENT (OUTPATIENT)
Dept: OTOLARYNGOLOGY | Facility: CLINIC | Age: 54
End: 2024-03-04

## 2024-03-04 ENCOUNTER — TRANSCRIPTION ENCOUNTER (OUTPATIENT)
Age: 54
End: 2024-03-04

## 2024-03-04 NOTE — ASU PATIENT PROFILE, ADULT - TEACHING/LEARNING LEARNING PREFERENCES
Normal rate, regular rhythm.  Heart sounds S1, S2.  No murmurs, rubs or gallops. audio/group instruction/skill demonstration/written material

## 2024-03-04 NOTE — ASU PATIENT PROFILE, ADULT - FALL HARM RISK - UNIVERSAL INTERVENTIONS
Bed in lowest position, wheels locked, appropriate side rails in place/Call bell, personal items and telephone in reach/Instruct patient to call for assistance before getting out of bed or chair/Non-slip footwear when patient is out of bed/[place] to call system/Physically safe environment - no spills, clutter or unnecessary equipment/Purposeful Proactive Rounding/Room/bathroom lighting operational, light cord in reach

## 2024-03-05 ENCOUNTER — INPATIENT (INPATIENT)
Facility: HOSPITAL | Age: 54
LOS: 0 days | Discharge: HOME | End: 2024-03-06
Attending: OTOLARYNGOLOGY | Admitting: OTOLARYNGOLOGY
Payer: COMMERCIAL

## 2024-03-05 ENCOUNTER — RESULT REVIEW (OUTPATIENT)
Age: 54
End: 2024-03-05

## 2024-03-05 ENCOUNTER — TRANSCRIPTION ENCOUNTER (OUTPATIENT)
Age: 54
End: 2024-03-05

## 2024-03-05 ENCOUNTER — APPOINTMENT (OUTPATIENT)
Dept: OTOLARYNGOLOGY | Facility: HOSPITAL | Age: 54
End: 2024-03-05
Payer: COMMERCIAL

## 2024-03-05 VITALS
OXYGEN SATURATION: 100 % | WEIGHT: 134.92 LBS | HEART RATE: 74 BPM | SYSTOLIC BLOOD PRESSURE: 109 MMHG | TEMPERATURE: 98 F | DIASTOLIC BLOOD PRESSURE: 65 MMHG | RESPIRATION RATE: 16 BRPM | HEIGHT: 61.5 IN

## 2024-03-05 DIAGNOSIS — Z98.890 OTHER SPECIFIED POSTPROCEDURAL STATES: Chronic | ICD-10-CM

## 2024-03-05 DIAGNOSIS — E04.1 NONTOXIC SINGLE THYROID NODULE: ICD-10-CM

## 2024-03-05 DIAGNOSIS — Z90.49 ACQUIRED ABSENCE OF OTHER SPECIFIED PARTS OF DIGESTIVE TRACT: Chronic | ICD-10-CM

## 2024-03-05 PROCEDURE — 88307 TISSUE EXAM BY PATHOLOGIST: CPT | Mod: 26

## 2024-03-05 PROCEDURE — 60271 REMOVAL OF THYROID: CPT | Mod: GC

## 2024-03-05 PROCEDURE — ZZZZZ: CPT

## 2024-03-05 PROCEDURE — 88341 IMHCHEM/IMCYTCHM EA ADD ANTB: CPT | Mod: 26

## 2024-03-05 PROCEDURE — 88342 IMHCHEM/IMCYTCHM 1ST ANTB: CPT | Mod: 26

## 2024-03-05 DEVICE — SURGICEL 2 X 14": Type: IMPLANTABLE DEVICE | Site: LEFT | Status: FUNCTIONAL

## 2024-03-05 DEVICE — TUBE EMG NIM TRIVANTAGE 7MM: Type: IMPLANTABLE DEVICE | Site: LEFT | Status: FUNCTIONAL

## 2024-03-05 DEVICE — CLIP APPLIER COVIDIEN SURGICLIP 11.5" MEDIUM: Type: IMPLANTABLE DEVICE | Site: LEFT | Status: FUNCTIONAL

## 2024-03-05 DEVICE — CLIP APPLIER COVIDIEN SURGICLIP III 9" SM: Type: IMPLANTABLE DEVICE | Site: LEFT | Status: FUNCTIONAL

## 2024-03-05 RX ORDER — FENTANYL CITRATE 50 UG/ML
50 INJECTION INTRAVENOUS
Refills: 0 | Status: DISCONTINUED | OUTPATIENT
Start: 2024-03-05 | End: 2024-03-05

## 2024-03-05 RX ORDER — ACETAMINOPHEN 500 MG
975 TABLET ORAL EVERY 6 HOURS
Refills: 0 | Status: DISCONTINUED | OUTPATIENT
Start: 2024-03-05 | End: 2024-03-06

## 2024-03-05 RX ORDER — OXYCODONE HYDROCHLORIDE 5 MG/1
5 TABLET ORAL EVERY 6 HOURS
Refills: 0 | Status: DISCONTINUED | OUTPATIENT
Start: 2024-03-05 | End: 2024-03-06

## 2024-03-05 RX ORDER — OXYCODONE HYDROCHLORIDE 5 MG/1
10 TABLET ORAL EVERY 6 HOURS
Refills: 0 | Status: DISCONTINUED | OUTPATIENT
Start: 2024-03-05 | End: 2024-03-06

## 2024-03-05 RX ORDER — ONDANSETRON 8 MG/1
4 TABLET, FILM COATED ORAL ONCE
Refills: 0 | Status: COMPLETED | OUTPATIENT
Start: 2024-03-05 | End: 2024-03-05

## 2024-03-05 RX ORDER — INFLUENZA VIRUS VACCINE 15; 15; 15; 15 UG/.5ML; UG/.5ML; UG/.5ML; UG/.5ML
0.5 SUSPENSION INTRAMUSCULAR ONCE
Refills: 0 | Status: DISCONTINUED | OUTPATIENT
Start: 2024-03-05 | End: 2024-03-06

## 2024-03-05 RX ORDER — ONDANSETRON 8 MG/1
4 TABLET, FILM COATED ORAL ONCE
Refills: 0 | Status: DISCONTINUED | OUTPATIENT
Start: 2024-03-05 | End: 2024-03-05

## 2024-03-05 RX ADMIN — OXYCODONE HYDROCHLORIDE 5 MILLIGRAM(S): 5 TABLET ORAL at 21:52

## 2024-03-05 RX ADMIN — ONDANSETRON 4 MILLIGRAM(S): 8 TABLET, FILM COATED ORAL at 21:22

## 2024-03-05 RX ADMIN — FENTANYL CITRATE 50 MICROGRAM(S): 50 INJECTION INTRAVENOUS at 16:17

## 2024-03-05 RX ADMIN — FENTANYL CITRATE 50 MICROGRAM(S): 50 INJECTION INTRAVENOUS at 16:45

## 2024-03-05 RX ADMIN — OXYCODONE HYDROCHLORIDE 5 MILLIGRAM(S): 5 TABLET ORAL at 15:01

## 2024-03-05 RX ADMIN — OXYCODONE HYDROCHLORIDE 5 MILLIGRAM(S): 5 TABLET ORAL at 21:22

## 2024-03-05 RX ADMIN — OXYCODONE HYDROCHLORIDE 5 MILLIGRAM(S): 5 TABLET ORAL at 16:00

## 2024-03-05 RX ADMIN — FENTANYL CITRATE 50 MICROGRAM(S): 50 INJECTION INTRAVENOUS at 13:35

## 2024-03-05 RX ADMIN — FENTANYL CITRATE 50 MICROGRAM(S): 50 INJECTION INTRAVENOUS at 14:00

## 2024-03-05 NOTE — DISCHARGE NOTE PROVIDER - NSDCFUSCHEDAPPT_GEN_ALL_CORE_FT
Viviane Cope  [facility]  OTOLARYNG [address] [facility]  Scheduled Appointment: 03/18/2024

## 2024-03-05 NOTE — DISCHARGE NOTE PROVIDER - HOSPITAL COURSE
53F no pmhx s/p L hemithyroidectomy 3/5. Transferred from PACU to 8s. Patient VS and labs trended. Pt provided pain medications. Patient with mild nausea overnight - medications provided.  Patient tolerating diet, voiding freely and ambulating at baseline. Patient cleared for d/c home on 3/6

## 2024-03-05 NOTE — DISCHARGE NOTE PROVIDER - CARE PROVIDER_API CALL
Viviane Cope  Otolaryngology  [address] [place] [id number]  Phone: [phone]  Fax: [phone]  Follow Up Time: 1 week

## 2024-03-05 NOTE — DISCHARGE NOTE PROVIDER - NSDCACTIVITY_GEN_ALL_CORE
Kwan AYLEEN GarSaldana Patient Age: 66 year old  MESSAGE:   Per pharmacy fax: Freestyle 10 day sensor is being discontinued. Need new rx for 14 day sensors and readers.     Next and Last Visit with Provider and Department  Last visit with this provider: 5/6/2019  Last visit with this department: 5/9/2019    Next visit with this provider: Visit date not found  Next visit with this department: Visit date not found    WEIGHT AND HEIGHT:   Wt Readings from Last 1 Encounters:   04/12/19 105.7 kg (233 lb)     Ht Readings from Last 1 Encounters:   05/09/19 6' 2\" (1.88 m)     BMI Readings from Last 1 Encounters:   05/09/19 29.92 kg/m²       ALLERGIES: no known allergies.  Current Outpatient Medications   Medication   • furosemide (LASIX) 40 MG tablet   • metoPROLOL tartrate (LOPRESSOR) 25 MG tablet   • warfarin (COUMADIN) 1 MG tablet   • warfarin (COUMADIN) 5 MG tablet   • glimepiride (AMARYL) 4 MG tablet   • lisinopril (ZESTRIL) 2.5 MG tablet   • ONETOUCH DELICA LANCETS 33G Misc   • atorvastatin (LIPITOR) 20 MG tablet   • Continuous Blood Gluc  (FREESTYLE SUSIE READER) Device   • Continuous Blood Gluc Sensor (FREESTYLE SUSIE SENSOR SYSTEM) Misc   • Multiple Vitamins-Minerals (MULTIVITAL) tablet   • aspirin 81 MG tablet   • atorvastatin (LIPITOR) 10 MG tablet   • metFORMIN (GLUCOPHAGE) 500 MG tablet   • ALPRAZolam (XANAX) 0.5 MG tablet     No current facility-administered medications for this visit.      PHARMACY to use: [facility]          Pharmacy preference(s) on file:   [id number] - [place], [place] - [address]  [address]  [facility] [address]  Phone: [phone] Fax: [phone]    [id number] - [place], [place] - [address]  [address]  [facility] [address]  Phone: [phone] Fax: [phone]      CALL BACK INFO: Ok to leave response (including medical information) on answering machine  ROUTING: Patient's physician/staff        PCP: Gil Vasquez MD         INS: Payor: [facility] /  Plan: PPO [id number] / Product Type: PPO MISC   PATIENT ADDRESS:   [address]  [facility] [address]     No heavy lifting/straining/Follow Instructions Provided by your Surgical Team

## 2024-03-05 NOTE — DISCHARGE NOTE PROVIDER - NSDCMRMEDTOKEN_GEN_ALL_CORE_FT
no home medications:    acetaminophen 325 mg oral tablet: 3 tab(s) orally every 6 hours as needed for Mild Pain (1 - 3) MDD: 4  no home medications:   Oxaydo 5 mg oral tablet: 1 tab(s) orally every 6 hours as needed for Moderate Pain (4 - 6) MDD: 4

## 2024-03-05 NOTE — DISCHARGE NOTE PROVIDER - NSDCCPCAREPLAN_GEN_ALL_CORE_FT
PRINCIPAL DISCHARGE DIAGNOSIS  Diagnosis: Nontoxic single thyroid nodule  Assessment and Plan of Treatment: Follow up outpatient

## 2024-03-06 ENCOUNTER — TRANSCRIPTION ENCOUNTER (OUTPATIENT)
Age: 54
End: 2024-03-06

## 2024-03-06 VITALS
SYSTOLIC BLOOD PRESSURE: 101 MMHG | OXYGEN SATURATION: 98 % | HEART RATE: 78 BPM | DIASTOLIC BLOOD PRESSURE: 56 MMHG | RESPIRATION RATE: 18 BRPM | TEMPERATURE: 98 F

## 2024-03-06 LAB
HCT VFR BLD CALC: 33.1 % — LOW (ref 34.5–45)
HGB BLD-MCNC: 11.3 G/DL — LOW (ref 11.5–15.5)
MCHC RBC-ENTMCNC: 29.7 PG — SIGNIFICANT CHANGE UP (ref 27–34)
MCHC RBC-ENTMCNC: 34.1 GM/DL — SIGNIFICANT CHANGE UP (ref 32–36)
MCV RBC AUTO: 87.1 FL — SIGNIFICANT CHANGE UP (ref 80–100)
NRBC # BLD: 0 /100 WBCS — SIGNIFICANT CHANGE UP (ref 0–0)
NRBC # FLD: 0 K/UL — SIGNIFICANT CHANGE UP (ref 0–0)
PLATELET # BLD AUTO: 273 K/UL — SIGNIFICANT CHANGE UP (ref 150–400)
RBC # BLD: 3.8 M/UL — SIGNIFICANT CHANGE UP (ref 3.8–5.2)
RBC # FLD: 13.5 % — SIGNIFICANT CHANGE UP (ref 10.3–14.5)
WBC # BLD: 8.68 K/UL — SIGNIFICANT CHANGE UP (ref 3.8–10.5)
WBC # FLD AUTO: 8.68 K/UL — SIGNIFICANT CHANGE UP (ref 3.8–10.5)

## 2024-03-06 RX ORDER — ACETAMINOPHEN 500 MG
3 TABLET ORAL
Qty: 180 | Refills: 0
Start: 2024-03-06 | End: 2024-03-20

## 2024-03-06 RX ORDER — OXYCODONE HYDROCHLORIDE 5 MG/1
1 TABLET ORAL
Qty: 8 | Refills: 0
Start: 2024-03-06 | End: 2024-03-07

## 2024-03-06 RX ORDER — SODIUM CHLORIDE 9 MG/ML
500 INJECTION, SOLUTION INTRAVENOUS ONCE
Refills: 0 | Status: COMPLETED | OUTPATIENT
Start: 2024-03-06 | End: 2024-03-06

## 2024-03-06 RX ADMIN — Medication 975 MILLIGRAM(S): at 05:36

## 2024-03-06 RX ADMIN — Medication 975 MILLIGRAM(S): at 05:08

## 2024-03-06 RX ADMIN — SODIUM CHLORIDE 500 MILLILITER(S): 9 INJECTION, SOLUTION INTRAVENOUS at 03:10

## 2024-03-06 NOTE — PROGRESS NOTE ADULT - SUBJECTIVE AND OBJECTIVE BOX
OTOLARYNGOLOGY (ENT) PROGRESS NOTE    PATIENT: BRITT BAR  MRN: [id number]  : 72  [id number]  DATE OF SERVICE:  24  	  Subjective/ Interval: Patient seen and examined at bedside. hypotensive 90s/60s overnight s/p 500 LR bolus, oVSS. Pain well controlled. Voice strong, not hoarse.    Vital Signs Last 24 Hrs  T(C): 36.7 (06 Mar 2024 05:00), Max: 37 (05 Mar 2024 17:00)  T(F): 98.1 (06 Mar 2024 05:00), Max: 98.6 (05 Mar 2024 17:00)  HR: 83 (06 Mar 2024 05:00) (71 - 98)  BP: 102/60 (06 Mar 2024 05:00) (90/44 - 123/74)  BP(mean): 71 (05 Mar 2024 17:15) (56 - 84)  RR: 18 (06 Mar 2024 05:00) (9 - 18)  SpO2: 99% (06 Mar 2024 05:00) (96% - 100%)    Parameters below as of 06 Mar 2024 05:00  Patient On (Oxygen Delivery Method): room air    General: NAD, A+Ox3  Respiratory: No respiratory distress, stridor, or stertor  Voice quality: normal  Face:  Symmetric without masses or lesions  OC/OP: tongue normal, floor of mouth WNL, no masses or lesions, OP clear  Neck: soft/flat, incision c/d/i       LABS                       
Quang

## 2024-03-06 NOTE — DISCHARGE NOTE NURSING/CASE MANAGEMENT/SOCIAL WORK - PATIENT PORTAL LINK FT
You can access the FollowMyHealth Patient Portal offered by [facility] by registering at the following website: [email or web address]. By joining [employer]’s FollowMyHealth portal, you will also be able to view your health information using other applications (apps) compatible with our system.

## 2024-03-11 ENCOUNTER — APPOINTMENT (OUTPATIENT)
Dept: OTOLARYNGOLOGY | Facility: CLINIC | Age: 54
End: 2024-03-11
Payer: COMMERCIAL

## 2024-03-11 VITALS — HEIGHT: 62 IN | WEIGHT: 135 LBS | BODY MASS INDEX: 24.84 KG/M2

## 2024-03-11 PROCEDURE — 99212 OFFICE O/P EST SF 10 MIN: CPT | Mod: 25

## 2024-03-11 NOTE — HISTORY OF PRESENT ILLNESS
[de-identified] : 53 year old female for follow up of thyroid nodules. She is s/p FNA on 2/2/24 which was suspicious   for neoplasm (Category IV). Thyroseq was negative with low variable . She is now s/p s/p left thyroid lobectomy on 3/5/24. Pathology  pending  She is doing well since surgery.  Surgical incision remains clean dry intact, steris in place. No warmth redness pus or signs of infection Denies dysphagia, odynophagia, dyspnea, dysphonia, hemopysis, fevers, pain.  Noticed 1/19 - went to bed Friday nightg with a left sided neck  mass that prompted her to go to [facility]. Having mild pain to the area, especially worse when she lays flat. Having difficulty swallowing both solids and liquids. Painful swallowing and choking. Lost about 15lbs within 2 weeks due to inability to eat. She is experiencing SOB with both rest and activity. No changes to voice, but it is painful to speak.  No Smoking history No ETOH use  2/2/24 FNA: Fine Needle Aspiration Report - Auth (Verified) Specimen(s) Submitted THYROID, LEFT, MID/LOWER POLE, US GUIDED FNA Final Diagnosis:  THYROID, LEFT, MID/LOWER POLE, US GUIDED FNA SUSPICIOUS FOR NEOPLASM   (Category IV). Hypo cellular specimen show follicular cells with abundant oncocytic cytoplasm mostly lying single with rare microfollicles and syncytial groups. These cells have enlarged nuclei, high nuclear/cytoplasmic ratio and finely granular cytoplasm. The background shows calcifications, macrophages and colloid but lacks lymphocytes. Although these features can be seen in hyperplastic nodule,   the overall features are suspicious for a oncocytic cell neoplasm  . Material has been sent for molecular test for thyroid cancer gene panel analysis at the request of the submitting clinician. An addendum will follow.  Test            Result         Reference      Accession      Result Date Range          Number ThyroSeq        See Note f                    [id number] 02/02/24 0              15:40 EST ThyroSeq Comment: CLINICAL HISTORY FNA cytology:  FN/SFN  ([place] IV)  THYROSEQ  V3 GC RESULTS SUMMARY LEFT MID THYROID, 5.8 CM NODULE, FNA  Test Result                             Probability of Cancer or NIFTP                 Potential Management NEGATIVE, WITH LOW               Likely low Variable * THYROID CELL CONTENT *See interpretation below for details   CT Neck 2/16/24:  COMPARISON: CT neck from 1/20/2024. Ultrasound neck from 2/20/2024 and back to 1/20/2024 FINDINGS: BRAIN: No focal enhancing lesions in the partially imaged brain parenchyma. SKULL BASE: Unremarkable AERODIGESTIVE TRACT: No masslike lesions or abnormal enhancement at the base of the tongue, nasopharynx and oropharynx. Lingual tonsillar hypertrophy and mild effacement of vallecular space. Vocal cords are symmetric. Parapharyngeal fat is intact. Supraglottic, glottic and infraglottic airways are patent. LYMPH NODES: No cervical adenopathy. SALIVARY GLANDS: Salivary glands are normal in size and symmetric. No abnormal enhancement or calcifications are identified. THYROID: Redemonstration of cystic mass lesions within left thyroid lobe measuring 4.9 cm x 4.5 a cm x 5.5 cm (TR X AP X CC), previously measured 5.6 cm x 5.2 cm x 5.4 cm. There is no enhancing nodular component. No surrounding inflammatory fat stranding or invasive features. Previously noted central hyperdensity component is not well seen on current exam. Associated mass effect with rightward tracheal deviation, similar to prior examination. Laterally mild compression of internal jugular vein which remains patent throughout.  Smaller hypodense nodule at superior aspect of the left lobe. Small hypodense nodule in the right thyroid lobe. PARANASAL SINUSES: The visualized paranasal sinuses are clear. Mastoids are intact. ORBITS: Intraorbital content is unremarkable. VESSELS: Patent extracranial carotid and vertebral arteries accounting for limits of given exam which is not tailored for angiographic assessment. LUNG APICES: No masses. BONES: No acute osseous abnormalities. IMPRESSION: Redemonstration of cystic mass lesion in the left thyroid lobe slightly decreased in size compared to prior. Resolution of previously noted central hyperdense component. Persistent mass effect with rightward tracheal deviation. No mass lesions or abnormal enhancement in aerodigestive mucosa. No cervical adenopathy. --- End of Report ---   CT neck 1/20/24 IMPRESSION: Large left thyroid cystic mass up to 5.6 cm in diameter and with possible internal hemorrhage given recent onset of pain. Larynx and trachea shifted midline   US thyroid and parathyroid 1/20/24 IMPRESSION: 5.5 cm complex cyst of the left thyroid lobe likely to represent recent hemorrhage within pre existing thyroid nodule   Labs 1/20/24 TSH  0.41, t4 9.3

## 2024-03-11 NOTE — ASSESSMENT
[FreeTextEntry1] : 53 year old female for follow up of thyroid nodules. She is s/p FNA on 2/2/24 which was suspicious   for neoplasm (Category IV). Thyroseq was negative with low variable . She is now s/p s/p left thyroid lobectomy on 3/5/24.   -She is doing well since surgery, post op incision care instructions provided  -Pathology  pending, will follow up and relay to patient ---and discuss need for additional therapy if indicated (completion thyroidectomy) -Follow up with PCP for thyroid function tests -Follow up in 3 months  -They are in agreement with this plan

## 2024-03-11 NOTE — PHYSICAL EXAM
[Midline] : trachea located in midline position [Normal] : no rashes [de-identified] : Steri strips removed Incision healing well, no s/s of infection noted

## 2024-03-18 LAB — SURGICAL PATHOLOGY STUDY: SIGNIFICANT CHANGE UP

## 2024-04-01 ENCOUNTER — TRANSCRIPTION ENCOUNTER (OUTPATIENT)
Age: 54
End: 2024-04-01

## 2024-06-24 ENCOUNTER — APPOINTMENT (OUTPATIENT)
Dept: OTOLARYNGOLOGY | Facility: CLINIC | Age: 54
End: 2024-06-24
Payer: COMMERCIAL

## 2024-06-24 VITALS
HEART RATE: 76 BPM | HEIGHT: 62 IN | OXYGEN SATURATION: 96 % | BODY MASS INDEX: 26.5 KG/M2 | WEIGHT: 144 LBS | SYSTOLIC BLOOD PRESSURE: 117 MMHG | DIASTOLIC BLOOD PRESSURE: 77 MMHG

## 2024-06-24 PROCEDURE — 99214 OFFICE O/P EST MOD 30 MIN: CPT | Mod: 25

## 2024-06-24 PROCEDURE — 31575 DIAGNOSTIC LARYNGOSCOPY: CPT

## 2024-06-24 NOTE — PHYSICAL EXAM
[Midline] : trachea located in midline position [Normal] : no rashes [de-identified] : Steri strips removed Incision healing well, no s/s of infection noted

## 2024-06-24 NOTE — HISTORY OF PRESENT ILLNESS
[de-identified] : 53 year old female for follow up of thyroid nodules. She is s/p FNA on 2/2/24 which was suspicious   for neoplasm (Category IV). Thyroseq was negative with low variable . She is now s/p s/p left thyroid lobectomy on 3/5/24. 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     Noticed 1/19 - went to bed Friday night with a left sided neck  mass that prompted her to go to [facility]. Having mild pain to the area, especially worse when she lays flat. Having difficulty swallowing both solids and liquids. Painful swallowing and choking. Lost about 15lbs within 2 weeks due to inability to eat. She is experiencing SOB with both rest and activity. No changes to voice, but it is painful to speak.  No Smoking history No ETOH use  2/2/24 FNA: Fine Needle Aspiration Report - Auth (Verified) Specimen(s) Submitted THYROID, LEFT, MID/LOWER POLE, US GUIDED FNA Final Diagnosis:  THYROID, LEFT, MID/LOWER POLE, US GUIDED FNA SUSPICIOUS FOR NEOPLASM   (Category IV). Hypo cellular specimen show follicular cells with abundant oncocytic cytoplasm mostly lying single with rare microfollicles and syncytial groups. These cells have enlarged nuclei, high nuclear/cytoplasmic ratio and finely granular cytoplasm. The background shows calcifications, macrophages and colloid but lacks lymphocytes. Although these features can be seen in hyperplastic nodule,   the overall features are suspicious for a oncocytic cell neoplasm  . Material has been sent for molecular test for thyroid cancer gene panel analysis at the request of the submitting clinician. An addendum will follow.  Test            Result         Reference      Accession      Result Date Range          Number ThyroSeq        See Note f                    [id number] 02/02/24 0              15:40 EST ThyroSeq Comment: CLINICAL HISTORY FNA cytology:  FN/SFN  ([place] IV)  THYROSEQ  V3 GC RESULTS SUMMARY LEFT MID THYROID, 5.8 CM NODULE, FNA  Test Result                             Probability of Cancer or NIFTP                 Potential Management NEGATIVE, WITH LOW               Likely low Variable * THYROID CELL CONTENT *See interpretation below for details   CT Neck 2/16/24:  COMPARISON: CT neck from 1/20/2024. Ultrasound neck from 2/20/2024 and back to 1/20/2024 FINDINGS: BRAIN: No focal enhancing lesions in the partially imaged brain parenchyma. SKULL BASE: Unremarkable AERODIGESTIVE TRACT: No masslike lesions or abnormal enhancement at the base of the tongue, nasopharynx and oropharynx. Lingual tonsillar hypertrophy and mild effacement of vallecular space. Vocal cords are symmetric. Parapharyngeal fat is intact. Supraglottic, glottic and infraglottic airways are patent. LYMPH NODES: No cervical adenopathy. SALIVARY GLANDS: Salivary glands are normal in size and symmetric. No abnormal enhancement or calcifications are identified. THYROID: Redemonstration of cystic mass lesions within left thyroid lobe measuring 4.9 cm x 4.5 a cm x 5.5 cm (TR X AP X CC), previously measured 5.6 cm x 5.2 cm x 5.4 cm. There is no enhancing nodular component. No surrounding inflammatory fat stranding or invasive features. Previously noted central hyperdensity component is not well seen on current exam. Associated mass effect with rightward tracheal deviation, similar to prior examination. Laterally mild compression of internal jugular vein which remains patent throughout.  Smaller hypodense nodule at superior aspect of the left lobe. Small hypodense nodule in the right thyroid lobe. PARANASAL SINUSES: The visualized paranasal sinuses are clear. Mastoids are intact. ORBITS: Intraorbital content is unremarkable. VESSELS: Patent extracranial carotid and vertebral arteries accounting for limits of given exam which is not tailored for angiographic assessment. LUNG APICES: No masses. BONES: No acute osseous abnormalities. IMPRESSION: Redemonstration of cystic mass lesion in the left thyroid lobe slightly decreased in size compared to prior. Resolution of previously noted central hyperdense component. Persistent mass effect with rightward tracheal deviation. No mass lesions or abnormal enhancement in aerodigestive mucosa. No cervical adenopathy. --- End of Report ---   CT neck 1/20/24 IMPRESSION: Large left thyroid cystic mass up to 5.6 cm in diameter and with possible internal hemorrhage given recent onset of pain. Larynx and trachea shifted midline   US thyroid and parathyroid 1/20/24 IMPRESSION: 5.5 cm complex cyst of the left thyroid lobe likely to represent recent hemorrhage within pre existing thyroid nodule   Labs 1/20/24 TSH  0.41, t4 9.3

## 2024-06-24 NOTE — ASSESSMENT
[FreeTextEntry1] : 53 year old female for follow up of thyroid nodules. She is s/p FNA on 2/2/24 which was suspicious   for neoplasm (Category IV). Thyroseq was negative with low variable . She is now s/p  left thyroid lobectomy on 3/5/24.   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  -Discussed need for completion thyroidectomy given pathology, size >4 cm, presence of contralateral nodule.  --observation is also a reasonable approach however completion thyroidectomy favored given features above.  --Pt has some hesitancy about moving forward with surgery and would like additional time to consider -Will also refer to Dr. Love for endocrinology for TFTs --will obtain updated ultrasound -Follow up in 2 months to further discuss moving forward with surgery at that time if she desires.  -They are in agreement with this plan

## 2024-06-24 NOTE — REASON FOR VISIT
[Subsequent Evaluation] : a subsequent evaluation for [FreeTextEntry1] : s/p left thyroid lobectomy

## 2024-08-05 ENCOUNTER — APPOINTMENT (OUTPATIENT)
Dept: ULTRASOUND IMAGING | Facility: CLINIC | Age: 54
End: 2024-08-05

## 2024-08-05 PROCEDURE — 76536 US EXAM OF HEAD AND NECK: CPT

## 2024-08-07 ENCOUNTER — APPOINTMENT (OUTPATIENT)
Dept: ENDOCRINOLOGY | Facility: CLINIC | Age: 54
End: 2024-08-07

## 2024-08-07 PROBLEM — C80.1: Status: ACTIVE | Noted: 2024-08-07

## 2024-08-07 PROBLEM — C73 THYROID CANCER: Status: ACTIVE | Noted: 2024-08-07

## 2024-08-07 PROCEDURE — 99205 OFFICE O/P NEW HI 60 MIN: CPT

## 2024-08-07 PROCEDURE — G2211 COMPLEX E/M VISIT ADD ON: CPT | Mod: NC

## 2024-08-07 NOTE — REASON FOR VISIT
[Initial Evaluation] : an initial evaluation [Thyroid nodule/ MNG] : thyroid nodule/ MNG [Thyroid Cancer] : thyroid cancer [[employer] ] : provided by [employer]   [Time Spent: ____ minutes] : Total time spent using  services: [unfilled] minutes. The patient's primary language is not English thus required  services. [Interpreters_FullName] : Radha

## 2024-08-07 NOTE — HISTORY OF PRESENT ILLNESS
[FreeTextEntry1] : CHIEF COMPLAINT: Oncocytic thyroid carcinoma REFERRED BY: Dr. Viviane Cope    HISTORY OF PRESENTING ILLNESS: The patient is a 53-year-old female being seen in the office today for evaluation of thyroid cancer, thyroid nodules.   Reports that she was initially noted to have a thyroid nodule/mass around 3 years ago, but she never had an FNA as she was told that ultrasound was not suspicious, she did not have any compressive symptoms at the time of diagnosis.  Around 1/2024, she started noticing cough and phlegm which led her to the ER. Thyroid US 1/2024 with large left-sided solid/cystic nodule. 2/2024 FNA of LMP 5.8 cm nodule FN/[place] 4 with TSv3 negative but with low thyroid cell count.   Surgery: 3/5/2024, left hemithyroidectomy Pathology: Unifocal left 4.9 cm minimally invasive oncocytic carcinoma (minimal capsular invasion).  No increased mitoses.  Extensive necrosis/infarct post FNA.  Angioinvasion cannot be determined due to fibrosis and granulation tissue in the thick capsule.  No lymphatic invasion, no ETE, margins negative for carcinoma. 2015 JOSÉ MIGUEL Risk: Intermediate AJCC 8th edition TNM Stage: T3a NX MX PLATT Treatment: None  Surveillance: Thyroid US 8/5/2024: Heterogeneous right thyroid lobe with RUP 1.2 cm spongiform nodule unchanged from preoperative ultrasound.  Left thyroidectomy bed with ALEX.    She was not started on levothyroxine post hemithyroidectomy.  Reports that her TFTs were normal with PCP.  Reports that she still has phlegm, but compressive symptoms have improved somewhat after surgery.  No dysphagia.  Normal energy.  Reports she has been gaining weight.  Neck incision is healing well, voice is okay.  No constipation/diarrhea.  Endorses some hot flashes/heat intolerance since she is perimenopausal.   No family history of thyroid cancer or thyroid disease. No personal history of radiation exposure to the head and neck area.  She is not on any medications or supplements.

## 2024-08-07 NOTE — ASSESSMENT
[FreeTextEntry1] : 1. Thyroid Cancer 2.  Thyroid nodule Surgery: 3/5/2024, left hemithyroidectomy Pathology: Unifocal left 4.9 cm minimally invasive oncocytic carcinoma (minimal capsular invasion). No increased mitoses. Extensive necrosis/infarct post FNA. Angioinvasion cannot be determined due to fibrosis and granulation tissue in the thick capsule. No lymphatic invasion, no ETE, margins negative for carcinoma. 2015 JOSÉ MIGUEL Risk: Intermediate AJCC 8th edition TNM Stage: T3a NX MX PLATT Treatment: None  Surveillance: Thyroid US 8/5/2024: Heterogeneous right thyroid lobe with RUP 1.2 cm spongiform nodule unchanged from preoperative ultrasound. Left thyroidectomy bed with ALEX.   PLAN: -She is status post hemithyroidectomy for a large oncocytic carcinoma in the left lobe.  Contralateral thyroid lobe with small spongiform nodule. -We discussed that the usual recommendations for oncocytic carcinomas >4 cm is completion thyroidectomy, which has been recommended to her by her surgeon as well.  However, she favors surveillance if possible.  We discussed that since her surgical margins were negative, we could consider active surveillance instead of immediate completion thyroidectomy.  She prefers this approach. -Thyroid US 8/5/2024 with stable spongiform nodule, left thyroidectomy bed unremarkable.  Can repeat thyroid US in 6 months. -We discussed that thyroglobulin monitoring is not very useful after hemithyroidectomy, but we can monitor periodically for trend. -Check TFTs, maintain TSH goal less than 2, we discussed that we may start levothyroxine if TSH is elevated.   RTC in 6 months with previsit ultrasound.  Theo Love MD [facility] at [address], [address] Ph: [phone] Fax: [phone]  I have spent 60 minutes of time on the encounter. Greater than 50% of the face to face encounter time was spent on counseling and/or coordination of care for management of thyroid cancer. Total time was spent on review of prior records, labs and imaging studies, history and physical examination, documentation of this encounter, placing orders, counseling and coordination of care, all on the date of this visit.

## 2024-08-07 NOTE — PHYSICAL EXAM
[TextEntry] : PHYSICAL EXAMINATION: Vital signs from today's encounter reviewed.  GENERAL: No acute distress, clinically eukinetic, normal appearance HEAD: Normocephalic, atraumatic EYES: conjunctivae are pink and moist, no icterus, no proptosis  NECK: well healing thyroidectomy incision, non-tender, no adenopathy CARDIOVASCULAR: well-perfused extremities, no peripheral edema RESPIRATORY: normal chest expansion with good pulmonary effort, no acute respiratory distress MUSCULOSKELETAL: no swelling, normal range of motion, normal gait SKIN: no pallor, no icterus, no rash  NEUROLOGIC: alert and oriented, no evident focal deficits, no tremors  PSYCHIATRIC: mood and affect are normal ENDOCRINE: No obvious stigmata of Cushing's or acromegaly present

## 2024-08-09 ENCOUNTER — APPOINTMENT (OUTPATIENT)
Dept: ULTRASOUND IMAGING | Facility: CLINIC | Age: 54
End: 2024-08-09

## 2024-08-09 PROCEDURE — 76700 US EXAM ABDOM COMPLETE: CPT

## 2024-08-19 ENCOUNTER — APPOINTMENT (OUTPATIENT)
Dept: OTOLARYNGOLOGY | Facility: CLINIC | Age: 54
End: 2024-08-19
Payer: MEDICAID

## 2024-08-19 VITALS
SYSTOLIC BLOOD PRESSURE: 112 MMHG | DIASTOLIC BLOOD PRESSURE: 75 MMHG | WEIGHT: 150 LBS | OXYGEN SATURATION: 97 % | BODY MASS INDEX: 27.6 KG/M2 | HEIGHT: 62 IN | HEART RATE: 80 BPM

## 2024-08-19 PROCEDURE — 99214 OFFICE O/P EST MOD 30 MIN: CPT | Mod: 25

## 2024-08-19 PROCEDURE — 31575 DIAGNOSTIC LARYNGOSCOPY: CPT

## 2024-08-19 NOTE — ASSESSMENT
[FreeTextEntry1] : 53 year old female for follow up of thyroid nodules. She is s/p FNA on 2/2/24 which was suspicious   for neoplasm (Category IV). Thyroseq was negative with low variable . She is now s/p s/p left thyroid lobectomy on 3/5/24. 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 -Reviewed updated thyroid US with pt and daughter in depth  -Discussed the potential need for completion thyroidectomy given pathology, size >4 cm, however with a stable US and right sided nodule TR-1 observation is also a reasonable approach, with active surveillance  -Will obtain repeat US in 5 months -Continue to follow with Dr. Love for endocrinology for TFTs -Follow up in 6 months to discuss updated imaging -They are in agreement with this plan

## 2024-08-19 NOTE — HISTORY OF PRESENT ILLNESS
[de-identified] : 53 year old female for follow up of thyroid nodules. She is s/p FNA on 2/2/24 which was suspicious   for neoplasm (Category IV). Thyroseq was negative with low variable . She is now s/p s/p left thyroid lobectomy on 3/5/24. 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  She is following with Dr. Love for TFT's and medication managment if needed   US from 8/19/24: Status post left thyroidectomy, 1.2 cm stable benign right thyroid TI-RAD 1   Noticed 1/19 - went to bed Friday night with a left sided neck  mass that prompted her to go to [facility]. Having mild pain to the area, especially worse when she lays flat. Having difficulty swallowing both solids and liquids. Painful swallowing and choking. Lost about 15lbs within 2 weeks due to inability to eat. She is experiencing SOB with both rest and activity. No changes to voice, but it is painful to speak.  No Smoking history No ETOH use  2/2/24 FNA: Fine Needle Aspiration Report - Auth (Verified) Specimen(s) Submitted THYROID, LEFT, MID/LOWER POLE, US GUIDED FNA Final Diagnosis:  THYROID, LEFT, MID/LOWER POLE, US GUIDED FNA SUSPICIOUS FOR NEOPLASM   (Category IV). Hypo cellular specimen show follicular cells with abundant oncocytic cytoplasm mostly lying single with rare microfollicles and syncytial groups. These cells have enlarged nuclei, high nuclear/cytoplasmic ratio and finely granular cytoplasm. The background shows calcifications, macrophages and colloid but lacks lymphocytes. Although these features can be seen in hyperplastic nodule,   the overall features are suspicious for a oncocytic cell neoplasm  . Material has been sent for molecular test for thyroid cancer gene panel analysis at the request of the submitting clinician. An addendum will follow.  Test            Result         Reference      Accession      Result Date Range          Number ThyroSeq        See Note f                    [id number] 02/02/24 0              15:40 EST ThyroSeq Comment: CLINICAL HISTORY FNA cytology:  FN/SFN  ([place] IV)  THYROSEQ  V3 GC RESULTS SUMMARY LEFT MID THYROID, 5.8 CM NODULE, FNA  Test Result                             Probability of Cancer or NIFTP                 Potential Management NEGATIVE, WITH LOW               Likely low Variable * THYROID CELL CONTENT *See interpretation below for details   CT Neck 2/16/24:  COMPARISON: CT neck from 1/20/2024. Ultrasound neck from 2/20/2024 and back to 1/20/2024 FINDINGS: BRAIN: No focal enhancing lesions in the partially imaged brain parenchyma. SKULL BASE: Unremarkable AERODIGESTIVE TRACT: No masslike lesions or abnormal enhancement at the base of the tongue, nasopharynx and oropharynx. Lingual tonsillar hypertrophy and mild effacement of vallecular space. Vocal cords are symmetric. Parapharyngeal fat is intact. Supraglottic, glottic and infraglottic airways are patent. LYMPH NODES: No cervical adenopathy. SALIVARY GLANDS: Salivary glands are normal in size and symmetric. No abnormal enhancement or calcifications are identified. THYROID: Redemonstration of cystic mass lesions within left thyroid lobe measuring 4.9 cm x 4.5 a cm x 5.5 cm (TR X AP X CC), previously measured 5.6 cm x 5.2 cm x 5.4 cm. There is no enhancing nodular component. No surrounding inflammatory fat stranding or invasive features. Previously noted central hyperdensity component is not well seen on current exam. Associated mass effect with rightward tracheal deviation, similar to prior examination. Laterally mild compression of internal jugular vein which remains patent throughout.  Smaller hypodense nodule at superior aspect of the left lobe. Small hypodense nodule in the right thyroid lobe. PARANASAL SINUSES: The visualized paranasal sinuses are clear. Mastoids are intact. ORBITS: Intraorbital content is unremarkable. VESSELS: Patent extracranial carotid and vertebral arteries accounting for limits of given exam which is not tailored for angiographic assessment. LUNG APICES: No masses. BONES: No acute osseous abnormalities. IMPRESSION: Redemonstration of cystic mass lesion in the left thyroid lobe slightly decreased in size compared to prior. Resolution of previously noted central hyperdense component. Persistent mass effect with rightward tracheal deviation. No mass lesions or abnormal enhancement in aerodigestive mucosa. No cervical adenopathy. --- End of Report ---   CT neck 1/20/24 IMPRESSION: Large left thyroid cystic mass up to 5.6 cm in diameter and with possible internal hemorrhage given recent onset of pain. Larynx and trachea shifted midline   US thyroid and parathyroid 1/20/24 IMPRESSION: 5.5 cm complex cyst of the left thyroid lobe likely to represent recent hemorrhage within pre existing thyroid nodule   Labs 1/20/24 TSH  0.41, t4 9.3

## 2024-08-19 NOTE — ASSESSMENT
[FreeTextEntry1] : 53 year old female for follow up of thyroid nodules. She is s/p FNA on 2/2/24 which was suspicious   for neoplasm (Category IV). Thyroseq was negative with low variable . She is now s/p s/p left thyroid lobectomy on 3/5/24. 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 -Reviewed updated thyroid US with pt and daughter in depth  -Discussed the potential need for completion thyroidectomy given pathology, size >4 cm, however with a stable US and right sided nodule TR-1 observation is also a reasonable approach, with active surveillance  -Will obtain repeat US in 5 months -Continue to follow with Dr. Love for endocrinology for TFTs -Follow up in 6 months to discuss updated imaging -They are in agreement with this plan

## 2024-08-19 NOTE — HISTORY OF PRESENT ILLNESS
[de-identified] : 53 year old female for follow up of thyroid nodules. She is s/p FNA on 2/2/24 which was suspicious   for neoplasm (Category IV). Thyroseq was negative with low variable . She is now s/p s/p left thyroid lobectomy on 3/5/24. 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  She is following with Dr. Love for TFT's and medication managment if needed   US from 8/19/24: Status post left thyroidectomy, 1.2 cm stable benign right thyroid TI-RAD 1   Noticed 1/19 - went to bed Friday night with a left sided neck  mass that prompted her to go to [facility]. Having mild pain to the area, especially worse when she lays flat. Having difficulty swallowing both solids and liquids. Painful swallowing and choking. Lost about 15lbs within 2 weeks due to inability to eat. She is experiencing SOB with both rest and activity. No changes to voice, but it is painful to speak.  No Smoking history No ETOH use  2/2/24 FNA: Fine Needle Aspiration Report - Auth (Verified) Specimen(s) Submitted THYROID, LEFT, MID/LOWER POLE, US GUIDED FNA Final Diagnosis:  THYROID, LEFT, MID/LOWER POLE, US GUIDED FNA SUSPICIOUS FOR NEOPLASM   (Category IV). Hypo cellular specimen show follicular cells with abundant oncocytic cytoplasm mostly lying single with rare microfollicles and syncytial groups. These cells have enlarged nuclei, high nuclear/cytoplasmic ratio and finely granular cytoplasm. The background shows calcifications, macrophages and colloid but lacks lymphocytes. Although these features can be seen in hyperplastic nodule,   the overall features are suspicious for a oncocytic cell neoplasm  . Material has been sent for molecular test for thyroid cancer gene panel analysis at the request of the submitting clinician. An addendum will follow.  Test            Result         Reference      Accession      Result Date Range          Number ThyroSeq        See Note f                    [id number] 02/02/24 0              15:40 EST ThyroSeq Comment: CLINICAL HISTORY FNA cytology:  FN/SFN  ([place] IV)  THYROSEQ  V3 GC RESULTS SUMMARY LEFT MID THYROID, 5.8 CM NODULE, FNA  Test Result                             Probability of Cancer or NIFTP                 Potential Management NEGATIVE, WITH LOW               Likely low Variable * THYROID CELL CONTENT *See interpretation below for details   CT Neck 2/16/24:  COMPARISON: CT neck from 1/20/2024. Ultrasound neck from 2/20/2024 and back to 1/20/2024 FINDINGS: BRAIN: No focal enhancing lesions in the partially imaged brain parenchyma. SKULL BASE: Unremarkable AERODIGESTIVE TRACT: No masslike lesions or abnormal enhancement at the base of the tongue, nasopharynx and oropharynx. Lingual tonsillar hypertrophy and mild effacement of vallecular space. Vocal cords are symmetric. Parapharyngeal fat is intact. Supraglottic, glottic and infraglottic airways are patent. LYMPH NODES: No cervical adenopathy. SALIVARY GLANDS: Salivary glands are normal in size and symmetric. No abnormal enhancement or calcifications are identified. THYROID: Redemonstration of cystic mass lesions within left thyroid lobe measuring 4.9 cm x 4.5 a cm x 5.5 cm (TR X AP X CC), previously measured 5.6 cm x 5.2 cm x 5.4 cm. There is no enhancing nodular component. No surrounding inflammatory fat stranding or invasive features. Previously noted central hyperdensity component is not well seen on current exam. Associated mass effect with rightward tracheal deviation, similar to prior examination. Laterally mild compression of internal jugular vein which remains patent throughout.  Smaller hypodense nodule at superior aspect of the left lobe. Small hypodense nodule in the right thyroid lobe. PARANASAL SINUSES: The visualized paranasal sinuses are clear. Mastoids are intact. ORBITS: Intraorbital content is unremarkable. VESSELS: Patent extracranial carotid and vertebral arteries accounting for limits of given exam which is not tailored for angiographic assessment. LUNG APICES: No masses. BONES: No acute osseous abnormalities. IMPRESSION: Redemonstration of cystic mass lesion in the left thyroid lobe slightly decreased in size compared to prior. Resolution of previously noted central hyperdense component. Persistent mass effect with rightward tracheal deviation. No mass lesions or abnormal enhancement in aerodigestive mucosa. No cervical adenopathy. --- End of Report ---   CT neck 1/20/24 IMPRESSION: Large left thyroid cystic mass up to 5.6 cm in diameter and with possible internal hemorrhage given recent onset of pain. Larynx and trachea shifted midline   US thyroid and parathyroid 1/20/24 IMPRESSION: 5.5 cm complex cyst of the left thyroid lobe likely to represent recent hemorrhage within pre existing thyroid nodule   Labs 1/20/24 TSH  0.41, t4 9.3

## 2024-08-19 NOTE — HISTORY OF PRESENT ILLNESS
[de-identified] : 53 year old female for follow up of thyroid nodules. She is s/p FNA on 2/2/24 which was suspicious   for neoplasm (Category IV). Thyroseq was negative with low variable . She is now s/p s/p left thyroid lobectomy on 3/5/24. 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  She is following with Dr. Love for TFT's and medication managment if needed   US from 8/19/24: Status post left thyroidectomy, 1.2 cm stable benign right thyroid TI-RAD 1   Noticed 1/19 - went to bed Friday night with a left sided neck  mass that prompted her to go to [facility]. Having mild pain to the area, especially worse when she lays flat. Having difficulty swallowing both solids and liquids. Painful swallowing and choking. Lost about 15lbs within 2 weeks due to inability to eat. She is experiencing SOB with both rest and activity. No changes to voice, but it is painful to speak.  No Smoking history No ETOH use  2/2/24 FNA: Fine Needle Aspiration Report - Auth (Verified) Specimen(s) Submitted THYROID, LEFT, MID/LOWER POLE, US GUIDED FNA Final Diagnosis:  THYROID, LEFT, MID/LOWER POLE, US GUIDED FNA SUSPICIOUS FOR NEOPLASM   (Category IV). Hypo cellular specimen show follicular cells with abundant oncocytic cytoplasm mostly lying single with rare microfollicles and syncytial groups. These cells have enlarged nuclei, high nuclear/cytoplasmic ratio and finely granular cytoplasm. The background shows calcifications, macrophages and colloid but lacks lymphocytes. Although these features can be seen in hyperplastic nodule,   the overall features are suspicious for a oncocytic cell neoplasm  . Material has been sent for molecular test for thyroid cancer gene panel analysis at the request of the submitting clinician. An addendum will follow.  Test            Result         Reference      Accession      Result Date Range          Number ThyroSeq        See Note f                    [id number] 02/02/24 0              15:40 EST ThyroSeq Comment: CLINICAL HISTORY FNA cytology:  FN/SFN  ([place] IV)  THYROSEQ  V3 GC RESULTS SUMMARY LEFT MID THYROID, 5.8 CM NODULE, FNA  Test Result                             Probability of Cancer or NIFTP                 Potential Management NEGATIVE, WITH LOW               Likely low Variable * THYROID CELL CONTENT *See interpretation below for details   CT Neck 2/16/24:  COMPARISON: CT neck from 1/20/2024. Ultrasound neck from 2/20/2024 and back to 1/20/2024 FINDINGS: BRAIN: No focal enhancing lesions in the partially imaged brain parenchyma. SKULL BASE: Unremarkable AERODIGESTIVE TRACT: No masslike lesions or abnormal enhancement at the base of the tongue, nasopharynx and oropharynx. Lingual tonsillar hypertrophy and mild effacement of vallecular space. Vocal cords are symmetric. Parapharyngeal fat is intact. Supraglottic, glottic and infraglottic airways are patent. LYMPH NODES: No cervical adenopathy. SALIVARY GLANDS: Salivary glands are normal in size and symmetric. No abnormal enhancement or calcifications are identified. THYROID: Redemonstration of cystic mass lesions within left thyroid lobe measuring 4.9 cm x 4.5 a cm x 5.5 cm (TR X AP X CC), previously measured 5.6 cm x 5.2 cm x 5.4 cm. There is no enhancing nodular component. No surrounding inflammatory fat stranding or invasive features. Previously noted central hyperdensity component is not well seen on current exam. Associated mass effect with rightward tracheal deviation, similar to prior examination. Laterally mild compression of internal jugular vein which remains patent throughout.  Smaller hypodense nodule at superior aspect of the left lobe. Small hypodense nodule in the right thyroid lobe. PARANASAL SINUSES: The visualized paranasal sinuses are clear. Mastoids are intact. ORBITS: Intraorbital content is unremarkable. VESSELS: Patent extracranial carotid and vertebral arteries accounting for limits of given exam which is not tailored for angiographic assessment. LUNG APICES: No masses. BONES: No acute osseous abnormalities. IMPRESSION: Redemonstration of cystic mass lesion in the left thyroid lobe slightly decreased in size compared to prior. Resolution of previously noted central hyperdense component. Persistent mass effect with rightward tracheal deviation. No mass lesions or abnormal enhancement in aerodigestive mucosa. No cervical adenopathy. --- End of Report ---   CT neck 1/20/24 IMPRESSION: Large left thyroid cystic mass up to 5.6 cm in diameter and with possible internal hemorrhage given recent onset of pain. Larynx and trachea shifted midline   US thyroid and parathyroid 1/20/24 IMPRESSION: 5.5 cm complex cyst of the left thyroid lobe likely to represent recent hemorrhage within pre existing thyroid nodule   Labs 1/20/24 TSH  0.41, t4 9.3

## 2024-08-19 NOTE — PHYSICAL EXAM
[Midline] : trachea located in midline position [Normal] : no rashes [de-identified] : Steri strips removed Incision healing well, no s/s of infection noted

## 2024-08-19 NOTE — PHYSICAL EXAM
[Midline] : trachea located in midline position [Normal] : no rashes [de-identified] : Steri strips removed Incision healing well, no s/s of infection noted

## 2024-08-19 NOTE — PHYSICAL EXAM
[Midline] : trachea located in midline position [Normal] : no rashes [de-identified] : Steri strips removed Incision healing well, no s/s of infection noted

## 2024-08-19 NOTE — ASSESSMENT
[FreeTextEntry1] : 53 year old female for follow up of thyroid nodules. She is s/p FNA on 2/2/24 which was suspicious   for neoplasm (Category IV). Thyroseq was negative with low variable . She is now s/p s/p left thyroid lobectomy on 3/5/24. 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 -Reviewed updated thyroid US with pt and daughter in depth  -Discussed the potential need for completion thyroidectomy given pathology, size >4 cm, however with a stable US and right sided nodule TR-1 observation is also a reasonable approach, with active surveillance  -Will obtain repeat US in 5 months -Continue to follow with Dr. Love for endocrinology for TFTs -Follow up in 6 months to discuss updated imaging -They are in agreement with this plan

## 2025-03-24 ENCOUNTER — APPOINTMENT (OUTPATIENT)
Dept: OTOLARYNGOLOGY | Facility: CLINIC | Age: 55
End: 2025-03-24
Payer: MEDICAID

## 2025-03-24 ENCOUNTER — NON-APPOINTMENT (OUTPATIENT)
Age: 55
End: 2025-03-24

## 2025-03-24 ENCOUNTER — APPOINTMENT (OUTPATIENT)
Dept: ENDOCRINOLOGY | Facility: CLINIC | Age: 55
End: 2025-03-24
Payer: MEDICAID

## 2025-03-24 VITALS
OXYGEN SATURATION: 99 % | BODY MASS INDEX: 26.31 KG/M2 | SYSTOLIC BLOOD PRESSURE: 118 MMHG | HEIGHT: 62 IN | DIASTOLIC BLOOD PRESSURE: 70 MMHG | HEART RATE: 77 BPM | WEIGHT: 143 LBS

## 2025-03-24 VITALS — HEART RATE: 73 BPM | DIASTOLIC BLOOD PRESSURE: 75 MMHG | SYSTOLIC BLOOD PRESSURE: 114 MMHG

## 2025-03-24 DIAGNOSIS — C73 MALIGNANT NEOPLASM OF THYROID GLAND: ICD-10-CM

## 2025-03-24 DIAGNOSIS — C80.1 MALIGNANT (PRIMARY) NEOPLASM, UNSPECIFIED: ICD-10-CM

## 2025-03-24 DIAGNOSIS — E04.1 NONTOXIC SINGLE THYROID NODULE: ICD-10-CM

## 2025-03-24 PROCEDURE — 99214 OFFICE O/P EST MOD 30 MIN: CPT

## 2025-03-24 PROCEDURE — 31575 DIAGNOSTIC LARYNGOSCOPY: CPT

## 2025-03-24 PROCEDURE — 99214 OFFICE O/P EST MOD 30 MIN: CPT | Mod: 25

## 2025-03-24 PROCEDURE — G2211 COMPLEX E/M VISIT ADD ON: CPT | Mod: NC

## 2025-03-25 LAB
ANION GAP SERPL CALC-SCNC: 13 MMOL/L
BUN SERPL-MCNC: 16 MG/DL
CALCIUM SERPL-MCNC: 9.8 MG/DL
CHLORIDE SERPL-SCNC: 102 MMOL/L
CO2 SERPL-SCNC: 24 MMOL/L
CREAT SERPL-MCNC: 0.69 MG/DL
EGFRCR SERPLBLD CKD-EPI 2021: 104 ML/MIN/1.73M2
GLUCOSE SERPL-MCNC: 121 MG/DL
POTASSIUM SERPL-SCNC: 4.5 MMOL/L
SODIUM SERPL-SCNC: 140 MMOL/L
T4 FREE SERPL-MCNC: 0.9 NG/DL
THYROGLOB AB SERPL-ACNC: 15.2 IU/ML
THYROGLOB SERPL-MCNC: 20.5 NG/ML
TSH SERPL-ACNC: 2.26 UIU/ML

## 2025-03-29 ENCOUNTER — APPOINTMENT (OUTPATIENT)
Dept: ULTRASOUND IMAGING | Facility: CLINIC | Age: 55
End: 2025-03-29
Payer: MEDICAID

## 2025-03-29 PROCEDURE — 76536 US EXAM OF HEAD AND NECK: CPT

## 2025-06-30 ENCOUNTER — APPOINTMENT (OUTPATIENT)
Dept: OTOLARYNGOLOGY | Facility: CLINIC | Age: 55
End: 2025-06-30
Payer: MEDICAID

## 2025-06-30 VITALS
SYSTOLIC BLOOD PRESSURE: 123 MMHG | HEIGHT: 62 IN | WEIGHT: 145 LBS | BODY MASS INDEX: 26.68 KG/M2 | HEART RATE: 71 BPM | DIASTOLIC BLOOD PRESSURE: 78 MMHG

## 2025-06-30 PROCEDURE — 99214 OFFICE O/P EST MOD 30 MIN: CPT | Mod: 25

## 2025-06-30 PROCEDURE — 31575 DIAGNOSTIC LARYNGOSCOPY: CPT

## 2025-07-01 ENCOUNTER — NON-APPOINTMENT (OUTPATIENT)
Age: 55
End: 2025-07-01

## 2025-07-02 ENCOUNTER — NON-APPOINTMENT (OUTPATIENT)
Age: 55
End: 2025-07-02

## 2025-08-09 ENCOUNTER — APPOINTMENT (OUTPATIENT)
Dept: ULTRASOUND IMAGING | Facility: CLINIC | Age: 55
End: 2025-08-09
Payer: MEDICAID

## 2025-08-09 PROCEDURE — 76700 US EXAM ABDOM COMPLETE: CPT

## 2025-09-08 ENCOUNTER — APPOINTMENT (OUTPATIENT)
Dept: ENDOCRINOLOGY | Facility: CLINIC | Age: 55
End: 2025-09-08
Payer: MEDICAID

## 2025-09-08 VITALS
SYSTOLIC BLOOD PRESSURE: 118 MMHG | DIASTOLIC BLOOD PRESSURE: 62 MMHG | HEART RATE: 55 BPM | WEIGHT: 145 LBS | BODY MASS INDEX: 26.52 KG/M2 | OXYGEN SATURATION: 99 %

## 2025-09-08 DIAGNOSIS — C80.1 MALIGNANT (PRIMARY) NEOPLASM, UNSPECIFIED: ICD-10-CM

## 2025-09-08 DIAGNOSIS — E04.1 NONTOXIC SINGLE THYROID NODULE: ICD-10-CM

## 2025-09-08 DIAGNOSIS — C73 MALIGNANT NEOPLASM OF THYROID GLAND: ICD-10-CM

## 2025-09-08 LAB
T4 FREE SERPL-MCNC: 1 NG/DL
THYROGLOB AB SERPL-ACNC: 20.5 IU/ML
THYROGLOB SERPL-MCNC: 19.8 NG/ML
TSH SERPL-ACNC: 1.45 UIU/ML

## 2025-09-08 PROCEDURE — 99214 OFFICE O/P EST MOD 30 MIN: CPT

## 2025-09-08 PROCEDURE — G2211 COMPLEX E/M VISIT ADD ON: CPT | Mod: NC

## (undated) DEVICE — SUT VICRYL 3-0 27" SH UNDYED

## (undated) DEVICE — PACK HEAD & NECK

## (undated) DEVICE — BIPOLAR FORCEP KIRWAN JEWELERS STR 4" X 0.4MM W 12FT CORD (GREEN)

## (undated) DEVICE — SUT MONOCRYL 5-0 18" P-3 UNDYED

## (undated) DEVICE — DRSG CURITY GAUZE SPONGE 4 X 4" 12-PLY

## (undated) DEVICE — LONE STAR RETRACTOR RING 12MM BLUNT DISP

## (undated) DEVICE — ELCTR MONOPOLAR STIMULATOR PROBE FLUSH-TIP

## (undated) DEVICE — DRSG STERISTRIPS 0.5 X 4"

## (undated) DEVICE — LIGASURE SMALL JAW

## (undated) DEVICE — SOL IRR POUR H2O 500ML

## (undated) DEVICE — ELCTR THUNDERBEAT HANDPIECE 0MM X 9CM OPEN FINE JAW

## (undated) DEVICE — GLV 7.5 PROTEXIS (WHITE)

## (undated) DEVICE — STAPLER SKIN VISI-STAT 35 WIDE

## (undated) DEVICE — LABELS BLANK W PEN

## (undated) DEVICE — DRAPE 3/4 SHEET 52X76"

## (undated) DEVICE — BIPOLAR FORCEP STRYKER STANDARD 7" X 1MM (YELLOW)

## (undated) DEVICE — VENODYNE/SCD SLEEVE CALF MEDIUM

## (undated) DEVICE — SUT VICRYL 3-0 18" SH UNDYED (POP-OFF)

## (undated) DEVICE — DRAPE TOWEL BLUE 17" X 24"

## (undated) DEVICE — SUT SILK 2-0 18" FS

## (undated) DEVICE — ELCTR BOVIE PENCIL SMOKE EVACUATION

## (undated) DEVICE — SUT MONOCRYL 4-0 27" PS-2 UNDYED

## (undated) DEVICE — CANISTER DISPOSABLE THIN WALL 3000CC

## (undated) DEVICE — DRAPE MAGNETIC INSTRUMENT MEDIUM

## (undated) DEVICE — DRAIN BLAKE 10FR ROUND

## (undated) DEVICE — DRSG MASTISOL

## (undated) DEVICE — DRAPE SPLIT SHEET 77" X 120"

## (undated) DEVICE — ELCTR GROUNDING PAD ADULT COVIDIEN

## (undated) DEVICE — SUT SILK 2-0 18" TIES

## (undated) DEVICE — WARMING BLANKET LOWER ADULT

## (undated) DEVICE — DRAIN RESERVOIR FOR JACKSON PRATT 100CC CARDINAL

## (undated) DEVICE — SUT SILK 2-0 30" SH